# Patient Record
Sex: FEMALE | Race: ASIAN | NOT HISPANIC OR LATINO
[De-identification: names, ages, dates, MRNs, and addresses within clinical notes are randomized per-mention and may not be internally consistent; named-entity substitution may affect disease eponyms.]

---

## 2019-05-31 ENCOUNTER — APPOINTMENT (OUTPATIENT)
Dept: ANTEPARTUM | Facility: CLINIC | Age: 39
End: 2019-05-31
Payer: COMMERCIAL

## 2019-05-31 ENCOUNTER — ASOB RESULT (OUTPATIENT)
Age: 39
End: 2019-05-31

## 2019-05-31 PROCEDURE — 36416 COLLJ CAPILLARY BLOOD SPEC: CPT

## 2019-05-31 PROCEDURE — 76813 OB US NUCHAL MEAS 1 GEST: CPT

## 2019-05-31 PROCEDURE — 76801 OB US < 14 WKS SINGLE FETUS: CPT

## 2019-06-13 ENCOUNTER — ASOB RESULT (OUTPATIENT)
Age: 39
End: 2019-06-13

## 2019-06-13 ENCOUNTER — APPOINTMENT (OUTPATIENT)
Dept: MATERNAL FETAL MEDICINE | Facility: CLINIC | Age: 39
End: 2019-06-13
Payer: COMMERCIAL

## 2019-06-13 PROCEDURE — G0108 DIAB MANAGE TRN  PER INDIV: CPT

## 2019-06-26 ENCOUNTER — APPOINTMENT (OUTPATIENT)
Dept: ANTEPARTUM | Facility: CLINIC | Age: 39
End: 2019-06-26
Payer: COMMERCIAL

## 2019-06-26 ENCOUNTER — ASOB RESULT (OUTPATIENT)
Age: 39
End: 2019-06-26

## 2019-06-26 ENCOUNTER — APPOINTMENT (OUTPATIENT)
Dept: MATERNAL FETAL MEDICINE | Facility: CLINIC | Age: 39
End: 2019-06-26
Payer: COMMERCIAL

## 2019-06-26 DIAGNOSIS — O99.810 ABNORMAL GLUCOSE COMPLICATING PREGNANCY: ICD-10-CM

## 2019-06-26 PROCEDURE — G0108 DIAB MANAGE TRN  PER INDIV: CPT

## 2019-06-26 PROCEDURE — 76805 OB US >/= 14 WKS SNGL FETUS: CPT

## 2019-06-27 RX ORDER — PEN NEEDLE, DIABETIC 29 G X1/2"
32G X 4 MM NEEDLE, DISPOSABLE MISCELLANEOUS
Qty: 1 | Refills: 1 | Status: ACTIVE | COMMUNITY
Start: 2019-06-26 | End: 1900-01-01

## 2019-07-09 ENCOUNTER — ASOB RESULT (OUTPATIENT)
Age: 39
End: 2019-07-09

## 2019-07-09 ENCOUNTER — APPOINTMENT (OUTPATIENT)
Dept: MATERNAL FETAL MEDICINE | Facility: CLINIC | Age: 39
End: 2019-07-09
Payer: COMMERCIAL

## 2019-07-09 ENCOUNTER — APPOINTMENT (OUTPATIENT)
Dept: ANTEPARTUM | Facility: CLINIC | Age: 39
End: 2019-07-09
Payer: COMMERCIAL

## 2019-07-09 PROCEDURE — 99213 OFFICE O/P EST LOW 20 MIN: CPT | Mod: 25

## 2019-07-09 PROCEDURE — G0108 DIAB MANAGE TRN  PER INDIV: CPT

## 2019-07-22 ENCOUNTER — APPOINTMENT (OUTPATIENT)
Dept: ANTEPARTUM | Facility: CLINIC | Age: 39
End: 2019-07-22
Payer: COMMERCIAL

## 2019-07-22 ENCOUNTER — ASOB RESULT (OUTPATIENT)
Age: 39
End: 2019-07-22

## 2019-07-22 PROCEDURE — 76811 OB US DETAILED SNGL FETUS: CPT

## 2019-08-02 ENCOUNTER — MESSAGE (OUTPATIENT)
Age: 39
End: 2019-08-02

## 2019-08-02 RX ORDER — INSULIN DETEMIR 100 [IU]/ML
100 INJECTION, SOLUTION SUBCUTANEOUS
Qty: 4 | Refills: 1 | Status: ACTIVE | COMMUNITY
Start: 2019-06-26 | End: 1900-01-01

## 2019-08-07 ENCOUNTER — APPOINTMENT (OUTPATIENT)
Dept: MATERNAL FETAL MEDICINE | Facility: CLINIC | Age: 39
End: 2019-08-07
Payer: COMMERCIAL

## 2019-08-07 ENCOUNTER — ASOB RESULT (OUTPATIENT)
Age: 39
End: 2019-08-07

## 2019-08-07 ENCOUNTER — APPOINTMENT (OUTPATIENT)
Dept: ANTEPARTUM | Facility: CLINIC | Age: 39
End: 2019-08-07
Payer: COMMERCIAL

## 2019-08-07 PROCEDURE — 76825 ECHO EXAM OF FETAL HEART: CPT

## 2019-08-07 PROCEDURE — 93325 DOPPLER ECHO COLOR FLOW MAPG: CPT

## 2019-08-07 PROCEDURE — G0108 DIAB MANAGE TRN  PER INDIV: CPT

## 2019-08-07 PROCEDURE — 76827 ECHO EXAM OF FETAL HEART: CPT

## 2019-08-20 ENCOUNTER — APPOINTMENT (OUTPATIENT)
Dept: ANTEPARTUM | Facility: CLINIC | Age: 39
End: 2019-08-20

## 2019-08-21 ENCOUNTER — APPOINTMENT (OUTPATIENT)
Dept: ANTEPARTUM | Facility: CLINIC | Age: 39
End: 2019-08-21
Payer: COMMERCIAL

## 2019-08-21 ENCOUNTER — ASOB RESULT (OUTPATIENT)
Age: 39
End: 2019-08-21

## 2019-08-21 PROCEDURE — 76816 OB US FOLLOW-UP PER FETUS: CPT

## 2019-09-05 ENCOUNTER — ASOB RESULT (OUTPATIENT)
Age: 39
End: 2019-09-05

## 2019-09-05 ENCOUNTER — APPOINTMENT (OUTPATIENT)
Dept: MATERNAL FETAL MEDICINE | Facility: CLINIC | Age: 39
End: 2019-09-05
Payer: COMMERCIAL

## 2019-09-05 PROCEDURE — G0108 DIAB MANAGE TRN  PER INDIV: CPT

## 2019-09-19 ENCOUNTER — APPOINTMENT (OUTPATIENT)
Dept: MATERNAL FETAL MEDICINE | Facility: CLINIC | Age: 39
End: 2019-09-19
Payer: COMMERCIAL

## 2019-09-19 ENCOUNTER — APPOINTMENT (OUTPATIENT)
Dept: ANTEPARTUM | Facility: CLINIC | Age: 39
End: 2019-09-19

## 2019-09-19 ENCOUNTER — ASOB RESULT (OUTPATIENT)
Age: 39
End: 2019-09-19

## 2019-09-19 PROCEDURE — G0108 DIAB MANAGE TRN  PER INDIV: CPT

## 2019-09-19 RX ORDER — LANCETS
EACH MISCELLANEOUS
Qty: 3 | Refills: 2 | Status: ACTIVE | COMMUNITY
Start: 2019-09-05 | End: 1900-01-01

## 2019-09-19 RX ORDER — BLOOD SUGAR DIAGNOSTIC
STRIP MISCELLANEOUS 4 TIMES DAILY
Qty: 3 | Refills: 2 | Status: ACTIVE | COMMUNITY
Start: 2019-09-05 | End: 1900-01-01

## 2019-10-17 ENCOUNTER — ASOB RESULT (OUTPATIENT)
Age: 39
End: 2019-10-17

## 2019-10-17 ENCOUNTER — APPOINTMENT (OUTPATIENT)
Dept: MATERNAL FETAL MEDICINE | Facility: CLINIC | Age: 39
End: 2019-10-17
Payer: COMMERCIAL

## 2019-10-17 ENCOUNTER — APPOINTMENT (OUTPATIENT)
Dept: ANTEPARTUM | Facility: CLINIC | Age: 39
End: 2019-10-17
Payer: COMMERCIAL

## 2019-10-17 PROCEDURE — 76816 OB US FOLLOW-UP PER FETUS: CPT

## 2019-10-17 PROCEDURE — G0108 DIAB MANAGE TRN  PER INDIV: CPT

## 2019-10-17 PROCEDURE — 76819 FETAL BIOPHYS PROFIL W/O NST: CPT

## 2019-10-20 ENCOUNTER — OUTPATIENT (OUTPATIENT)
Dept: INPATIENT UNIT | Facility: HOSPITAL | Age: 39
LOS: 1 days | Discharge: HOME | End: 2019-10-20
Payer: COMMERCIAL

## 2019-10-20 VITALS — DIASTOLIC BLOOD PRESSURE: 59 MMHG | HEART RATE: 58 BPM | SYSTOLIC BLOOD PRESSURE: 114 MMHG

## 2019-10-20 VITALS
SYSTOLIC BLOOD PRESSURE: 135 MMHG | TEMPERATURE: 98 F | DIASTOLIC BLOOD PRESSURE: 70 MMHG | HEART RATE: 63 BPM | RESPIRATION RATE: 17 BRPM

## 2019-10-20 DIAGNOSIS — Z3A.00 WEEKS OF GESTATION OF PREGNANCY NOT SPECIFIED: ICD-10-CM

## 2019-10-20 DIAGNOSIS — O26.899 OTHER SPECIFIED PREGNANCY RELATED CONDITIONS, UNSPECIFIED TRIMESTER: ICD-10-CM

## 2019-10-20 LAB
APPEARANCE UR: SIGNIFICANT CHANGE UP
BACTERIA # UR AUTO: SIGNIFICANT CHANGE UP
BILIRUB UR-MCNC: NEGATIVE — SIGNIFICANT CHANGE UP
BLOOD UR QL VISUAL: HIGH
COLOR SPEC: YELLOW — SIGNIFICANT CHANGE UP
GLUCOSE UR-MCNC: 30 — SIGNIFICANT CHANGE UP
HCT VFR BLD CALC: 26.8 % — LOW (ref 34.5–45)
HGB BLD-MCNC: 8.6 G/DL — LOW (ref 11.5–15.5)
HYALINE CASTS # UR AUTO: HIGH
KETONES UR-MCNC: SIGNIFICANT CHANGE UP
LEUKOCYTE ESTERASE UR-ACNC: HIGH
MCHC RBC-ENTMCNC: 28.6 PG — SIGNIFICANT CHANGE UP (ref 27–34)
MCHC RBC-ENTMCNC: 32.1 % — SIGNIFICANT CHANGE UP (ref 32–36)
MCV RBC AUTO: 89 FL — SIGNIFICANT CHANGE UP (ref 80–100)
NITRITE UR-MCNC: NEGATIVE — SIGNIFICANT CHANGE UP
NRBC # FLD: 0 K/UL — SIGNIFICANT CHANGE UP (ref 0–0)
PH UR: 6 — SIGNIFICANT CHANGE UP (ref 5–8)
PLATELET # BLD AUTO: 136 K/UL — LOW (ref 150–400)
PMV BLD: 10.5 FL — SIGNIFICANT CHANGE UP (ref 7–13)
PROT UR-MCNC: 100 — HIGH
RBC # BLD: 3.01 M/UL — LOW (ref 3.8–5.2)
RBC # FLD: 13 % — SIGNIFICANT CHANGE UP (ref 10.3–14.5)
RBC CASTS # UR COMP ASSIST: >50 — HIGH (ref 0–?)
SP GR SPEC: 1.03 — SIGNIFICANT CHANGE UP (ref 1–1.04)
SQUAMOUS # UR AUTO: SIGNIFICANT CHANGE UP
UROBILINOGEN FLD QL: NORMAL — SIGNIFICANT CHANGE UP
WBC # BLD: 7.41 K/UL — SIGNIFICANT CHANGE UP (ref 3.8–10.5)
WBC # FLD AUTO: 7.41 K/UL — SIGNIFICANT CHANGE UP (ref 3.8–10.5)
WBC UR QL: HIGH (ref 0–?)

## 2019-10-20 PROCEDURE — 99214 OFFICE O/P EST MOD 30 MIN: CPT | Mod: 25

## 2019-10-20 PROCEDURE — 76770 US EXAM ABDO BACK WALL COMP: CPT | Mod: 26

## 2019-10-20 PROCEDURE — 76818 FETAL BIOPHYS PROFILE W/NST: CPT | Mod: 26

## 2019-10-20 RX ORDER — ACETAMINOPHEN 500 MG
975 TABLET ORAL ONCE
Refills: 0 | Status: COMPLETED | OUTPATIENT
Start: 2019-10-20 | End: 2019-10-20

## 2019-10-20 RX ORDER — DOCUSATE SODIUM 100 MG
1 CAPSULE ORAL
Qty: 60 | Refills: 0
Start: 2019-10-20 | End: 2019-11-18

## 2019-10-20 RX ORDER — SODIUM CHLORIDE 9 MG/ML
1000 INJECTION, SOLUTION INTRAVENOUS ONCE
Refills: 0 | Status: COMPLETED | OUTPATIENT
Start: 2019-10-20 | End: 2019-10-20

## 2019-10-20 RX ORDER — FERROUS SULFATE 325(65) MG
13 TABLET ORAL
Qty: 1170 | Refills: 0
Start: 2019-10-20 | End: 2019-11-18

## 2019-10-20 RX ADMIN — SODIUM CHLORIDE 2000 MILLILITER(S): 9 INJECTION, SOLUTION INTRAVENOUS at 06:26

## 2019-10-20 RX ADMIN — Medication 975 MILLIGRAM(S): at 06:12

## 2019-10-20 RX ADMIN — Medication 975 MILLIGRAM(S): at 06:27

## 2019-10-20 NOTE — OB PROVIDER TRIAGE NOTE - HISTORY OF PRESENT ILLNESS
39 yr old  @ 33.3 wk, presents with left sided sharp stabbing pain since 2 am. 6/10 pain. Pain relieved on standing, worsens while lying down, Denies strenuous activity or injury. Denies urinary symptoms, Denies VB/LOF/Ctx. Reports positive fetal movement.   PNI: GDMA 2 in levemir 28 units q HS  Obhs: , FT, NRFHT

## 2019-10-20 NOTE — OB PROVIDER TRIAGE NOTE - ADDITIONAL INSTRUCTIONS
Please take your iron supplement as prescribed. Please drink 1-2 liters of fluid per day. Please make an appointment to be seen in your prenatal office this week.

## 2019-10-20 NOTE — OB PROVIDER TRIAGE NOTE - NSOBPROVIDERNOTE_OBGYN_ALL_OB_FT
39 yr old  @ 33.3 wk, presents with left sided sharp stabbing pain since 2 am. 10 pain. Pain relieved on standing, worsens while lying down, Denies strenuous activity or injury. Denies urinary symptoms, Denies VB/LOF/Ctx. Reports positive fetal movement.   PNI: GDMA 2 in levemir 28 units q HS  Obhx: , FT, NRFHT     VS: 132/70  TOCO: ctx irritability  NST: , +accels, -decels, moderate variability, in progress  Abd: soft, non-tender  Mild CVA on left side    Plan:   IV hydration  PO tylenol  Renal sono  UA 39 yr old  @ 33.3 wk, presents with left sided sharp stabbing pain since 2 am. 6/10 pain. Pain relieved on standing, worsens while lying down, Denies strenuous activity or injury. Denies urinary symptoms, Denies VB/LOF/Ctx. Reports positive fetal movement.   PNI: GDMA 2 in levemir 28 units q HS  Obhx: , FT, NRFHT 2016    VS: 132/70  TOCO: ctx irritability  NST: , +accels, -decels, moderate variability, in progress  Abd: soft, non-tender  Mild CVA on left side    Plan:   IV hydration  PO tylenol  Renal sono  UA    @ 0624  s/p renal scan  UA with 100 protein, >50 RBC, 11-25 WBC, Large Blood, Trace Ketones  VE: L/C/P  CAT 1 tracing  TOCO: ctx irregular 39 yr old  @ 33.3 wk, presents with left sided sharp stabbing pain since 2 am. 6/10 pain. Pain relieved on standing, worsens while lying down, Denies strenuous activity or injury. Denies urinary symptoms, Denies VB/LOF/Ctx. Reports positive fetal movement.   PNI: GDMA 2 in levemir 28 units q HS  Obhx: , FT, NRFHT 2016    VS: 132/70  TOCO: ctx irritability  NST: , +accels, -decels, moderate variability, in progress  Abd: soft, non-tender  Mild CVA on left side    Plan:   IV hydration  PO tylenol  Renal sono  UA    @ 0624  s/p renal scan  UA with 100 protein, >50 RBC, 11-25 WBC, Large Blood, Trace Ketones  VE: L/C/P  CAT 1 tracing  TOCO: ctx irregular    07    Received report from BRIANNA Salas CNM. awaiting cbc.    0803  7.4 > 8.6/24.8 < 136  pt is s/p RL 1L IVB    pt verbalize relief from pain    discussed with Dr Gonzalez. Pt for discharge home with  precautions/fetal kick counts reviewed. Encouraged increased PO hydration. Triage team will follow up with ucx result. Pt to increased ferrous sulfate supplement and follow up with prenatal appointment in OB office this week.    CÉSAR Alicia 39 yr old  @ 33.3 wk, presents with left sided sharp stabbing pain since 2 am. 6/10 pain. Pain relieved on standing, worsens while lying down, Denies strenuous activity or injury. Denies urinary symptoms, Denies VB/LOF/Ctx. Reports positive fetal movement.   PNI: GDMA 2 in levemir 28 units q HS  Obhx: , FT, NRFHT 2016    VS: 132/70  TOCO: ctx irritability  NST: , +accels, -decels, moderate variability, in progress  Abd: soft, non-tender  Mild CVA on left side    Plan:   IV hydration  PO tylenol  Renal sono  UA    @ 0624  s/p renal scan  UA with 100 protein, >50 RBC, 11-25 WBC, Large Blood, Trace Ketones  VE: L/C/P  CAT 1 tracing  TOCO: ctx irregular    07    Received report from BRIANNA Salas CNM. awaiting cbc.    0803  7.4 > 8.6/24.8 < 136  pt is s/p RL 1L IVB    pt verbalize relief from pain    discussed with Dr Gonzalez. Pt for discharge home with  precautions/fetal kick counts reviewed. Encouraged increased PO hydration. Triage team will follow up with ucx result. Pt to increased ferrous sulfate supplement (rx for ferrous sulfate and colace sent to patient pharmacy) and follow up with prenatal appointment in OB office this week.    CÉSAR Alicia

## 2019-10-20 NOTE — OB PROVIDER TRIAGE NOTE - NSHPPHYSICALEXAM_GEN_ALL_CORE
VS: 132/70  TOCO: ctx irritability  NST: , +accels, -decels, moderate variability, in progress  Abd: soft, non-tender  Mild CVA on left side

## 2019-10-20 NOTE — OB PROVIDER TRIAGE NOTE - NSOUTCOME1_OBGYN_ALL_OB
Patient arrived to room 2135 as direct admit - patient with cardiac history and GI bleed. Hgb 5.8 at 1129 today. Current bed not tele capable. Patient to be transferred. Labs obtained and IV placed to left forearm at this time. Patient and family notified that room will have to be changed. 1556: Call from lab - CBC and PT clotted and need to be redrawn. Notified primary nurse. Term

## 2019-10-20 NOTE — OB PROVIDER TRIAGE NOTE - NSHPLABSRESULTS_GEN_ALL_CORE
Urinalysis Basic - ( 20 Oct 2019 05:10 )    Color: YELLOW / Appearance: Lt TURBID / S.035 / pH: 6.0  Gluc: 30 / Ketone: TRACE  / Bili: NEGATIVE / Urobili: NORMAL   Blood: LARGE / Protein: 100 / Nitrite: NEGATIVE   Leuk Esterase: SMALL / RBC: >50 / WBC 11-25   Sq Epi: MODERATE / Non Sq Epi: x / Bacteria: FEW

## 2019-10-30 ENCOUNTER — OUTPATIENT (OUTPATIENT)
Dept: INPATIENT UNIT | Facility: HOSPITAL | Age: 39
LOS: 1 days | Discharge: HOME | End: 2019-10-30
Payer: COMMERCIAL

## 2019-10-30 VITALS
RESPIRATION RATE: 16 BRPM | DIASTOLIC BLOOD PRESSURE: 65 MMHG | TEMPERATURE: 98 F | SYSTOLIC BLOOD PRESSURE: 94 MMHG | HEART RATE: 85 BPM

## 2019-10-30 VITALS — HEART RATE: 79 BPM | SYSTOLIC BLOOD PRESSURE: 116 MMHG | DIASTOLIC BLOOD PRESSURE: 54 MMHG

## 2019-10-30 DIAGNOSIS — Z3A.00 WEEKS OF GESTATION OF PREGNANCY NOT SPECIFIED: ICD-10-CM

## 2019-10-30 DIAGNOSIS — O26.899 OTHER SPECIFIED PREGNANCY RELATED CONDITIONS, UNSPECIFIED TRIMESTER: ICD-10-CM

## 2019-10-30 PROBLEM — N83.201 UNSPECIFIED OVARIAN CYST, RIGHT SIDE: Chronic | Status: ACTIVE | Noted: 2019-10-20

## 2019-10-30 PROBLEM — R87.619 UNSPECIFIED ABNORMAL CYTOLOGICAL FINDINGS IN SPECIMENS FROM CERVIX UTERI: Chronic | Status: ACTIVE | Noted: 2019-10-20

## 2019-10-30 PROCEDURE — 59025 FETAL NON-STRESS TEST: CPT | Mod: 26

## 2019-10-30 NOTE — OB PROVIDER TRIAGE NOTE - HISTORY OF PRESENT ILLNESS
38 y.o. G 39 y.o.  MERLE 19 @ 34.6 weeks presents from scheduled prenatal appointment for evaluation of nonreactive NST in office. Pt denies LOF, VB, ctx. States +FM. Antepartum course complicated by GDMA2 on levemir.

## 2019-10-30 NOTE — OB PROVIDER TRIAGE NOTE - NSHPPHYSICALEXAM_GEN_ALL_CORE
abdomen soft, nontender  taus: sliup, cephalic presentation posterior placenta, bpp 8/8, karl 15.3  nst reactive  toco: occ ctx noted

## 2019-10-30 NOTE — OB PROVIDER TRIAGE NOTE - NSOBPROVIDERNOTE_OBGYN_ALL_OB_FT
39 y.o.  MERLE 19 @ 34.6 weeks presents from scheduled prenatal appointment for evaluation of nonreactive NST in office. Pt denies LOF, VB, ctx. States +FM. Antepartum course complicated by GDMA2 on levemir.    allergies:  NKDA  medications:  prenatal vitamins  levemir 28 units q HS     med/surg hx:  cs x 1  OBGYN hx:  right ovarian cyst  2016 primary cs 2/2 NRFHT 7lbs 8 oz    abdomen soft, nontender  taus: sliup, cephalic presentation posterior placenta, bpp 8/8, karl 15.3  nst reactive  toco: occ ctx noted    evidence of reactive NST  BPP 10/10    discussed with  39 y.o.  MERLE 19 @ 34.6 weeks presents from scheduled prenatal appointment for evaluation of nonreactive NST in office. Pt denies LOF, VB, ctx. States +FM. Antepartum course complicated by GDMA2 on levemir.    allergies:  NKDA  medications:  prenatal vitamins  levemir 28 units q HS     med/surg hx:  cs x 1  OBGYN hx:  right ovarian cyst  2016 primary cs 2/2 NRFHT 7lbs 8 oz    abdomen soft, nontender  taus: sliup, cephalic presentation posterior placenta, bpp 8/8, karl 15.3  nst reactive  toco: occ ctx noted    evidence of reactive NST  BPP 10/10    discussed with Dr Guadarrama. Pt discharged home with  labor precautions/fetal kick counts reviewed. Encouraged increased PO hydration. F/U next scheduled prenatal appointment.    Maral, NP

## 2019-11-08 ENCOUNTER — APPOINTMENT (OUTPATIENT)
Dept: ANTEPARTUM | Facility: CLINIC | Age: 39
End: 2019-11-08
Payer: COMMERCIAL

## 2019-11-08 ENCOUNTER — ASOB RESULT (OUTPATIENT)
Age: 39
End: 2019-11-08

## 2019-11-08 ENCOUNTER — APPOINTMENT (OUTPATIENT)
Dept: MATERNAL FETAL MEDICINE | Facility: CLINIC | Age: 39
End: 2019-11-08
Payer: COMMERCIAL

## 2019-11-08 PROCEDURE — 76816 OB US FOLLOW-UP PER FETUS: CPT

## 2019-11-08 PROCEDURE — G0108 DIAB MANAGE TRN  PER INDIV: CPT

## 2019-11-12 ENCOUNTER — APPOINTMENT (OUTPATIENT)
Dept: ANTEPARTUM | Facility: HOSPITAL | Age: 39
End: 2019-11-12

## 2019-11-15 ENCOUNTER — OUTPATIENT (OUTPATIENT)
Dept: OUTPATIENT SERVICES | Facility: HOSPITAL | Age: 39
LOS: 1 days | End: 2019-11-15

## 2019-11-15 ENCOUNTER — ASOB RESULT (OUTPATIENT)
Age: 39
End: 2019-11-15

## 2019-11-15 ENCOUNTER — APPOINTMENT (OUTPATIENT)
Dept: ANTEPARTUM | Facility: CLINIC | Age: 39
End: 2019-11-15
Payer: COMMERCIAL

## 2019-11-15 ENCOUNTER — APPOINTMENT (OUTPATIENT)
Dept: ANTEPARTUM | Facility: HOSPITAL | Age: 39
End: 2019-11-15

## 2019-11-15 PROCEDURE — 76818 FETAL BIOPHYS PROFILE W/NST: CPT | Mod: 26

## 2019-11-22 ENCOUNTER — ASOB RESULT (OUTPATIENT)
Age: 39
End: 2019-11-22

## 2019-11-22 ENCOUNTER — APPOINTMENT (OUTPATIENT)
Dept: MATERNAL FETAL MEDICINE | Facility: CLINIC | Age: 39
End: 2019-11-22
Payer: COMMERCIAL

## 2019-11-22 ENCOUNTER — OUTPATIENT (OUTPATIENT)
Dept: OUTPATIENT SERVICES | Facility: HOSPITAL | Age: 39
LOS: 1 days | End: 2019-11-22

## 2019-11-22 ENCOUNTER — APPOINTMENT (OUTPATIENT)
Dept: ANTEPARTUM | Facility: HOSPITAL | Age: 39
End: 2019-11-22

## 2019-11-22 ENCOUNTER — APPOINTMENT (OUTPATIENT)
Dept: ANTEPARTUM | Facility: CLINIC | Age: 39
End: 2019-11-22
Payer: COMMERCIAL

## 2019-11-22 VITALS
RESPIRATION RATE: 14 BRPM | OXYGEN SATURATION: 98 % | SYSTOLIC BLOOD PRESSURE: 124 MMHG | DIASTOLIC BLOOD PRESSURE: 70 MMHG | WEIGHT: 199.08 LBS | HEART RATE: 74 BPM | HEIGHT: 63 IN | TEMPERATURE: 99 F

## 2019-11-22 DIAGNOSIS — Z34.90 ENCOUNTER FOR SUPERVISION OF NORMAL PREGNANCY, UNSPECIFIED, UNSPECIFIED TRIMESTER: ICD-10-CM

## 2019-11-22 DIAGNOSIS — E11.9 TYPE 2 DIABETES MELLITUS WITHOUT COMPLICATIONS: ICD-10-CM

## 2019-11-22 LAB
ANION GAP SERPL CALC-SCNC: 14 MMO/L — SIGNIFICANT CHANGE UP (ref 7–14)
APPEARANCE UR: CLEAR — SIGNIFICANT CHANGE UP
BACTERIA # UR AUTO: NEGATIVE — SIGNIFICANT CHANGE UP
BILIRUB UR-MCNC: NEGATIVE — SIGNIFICANT CHANGE UP
BLD GP AB SCN SERPL QL: NEGATIVE — SIGNIFICANT CHANGE UP
BLOOD UR QL VISUAL: HIGH
BUN SERPL-MCNC: 12 MG/DL — SIGNIFICANT CHANGE UP (ref 7–23)
CALCIUM SERPL-MCNC: 9.6 MG/DL — SIGNIFICANT CHANGE UP (ref 8.4–10.5)
CHLORIDE SERPL-SCNC: 103 MMOL/L — SIGNIFICANT CHANGE UP (ref 98–107)
CO2 SERPL-SCNC: 22 MMOL/L — SIGNIFICANT CHANGE UP (ref 22–31)
COLOR SPEC: YELLOW — SIGNIFICANT CHANGE UP
CREAT SERPL-MCNC: 0.84 MG/DL — SIGNIFICANT CHANGE UP (ref 0.5–1.3)
GLUCOSE SERPL-MCNC: 83 MG/DL — SIGNIFICANT CHANGE UP (ref 70–99)
GLUCOSE UR-MCNC: NEGATIVE — SIGNIFICANT CHANGE UP
HCT VFR BLD CALC: 41.4 % — SIGNIFICANT CHANGE UP (ref 34.5–45)
HGB BLD-MCNC: 13.4 G/DL — SIGNIFICANT CHANGE UP (ref 11.5–15.5)
HYALINE CASTS # UR AUTO: NEGATIVE — SIGNIFICANT CHANGE UP
KETONES UR-MCNC: NEGATIVE — SIGNIFICANT CHANGE UP
LEUKOCYTE ESTERASE UR-ACNC: NEGATIVE — SIGNIFICANT CHANGE UP
MCHC RBC-ENTMCNC: 28.8 PG — SIGNIFICANT CHANGE UP (ref 27–34)
MCHC RBC-ENTMCNC: 32.4 % — SIGNIFICANT CHANGE UP (ref 32–36)
MCV RBC AUTO: 88.8 FL — SIGNIFICANT CHANGE UP (ref 80–100)
NITRITE UR-MCNC: NEGATIVE — SIGNIFICANT CHANGE UP
NRBC # FLD: 0 K/UL — SIGNIFICANT CHANGE UP (ref 0–0)
PH UR: 6 — SIGNIFICANT CHANGE UP (ref 5–8)
PLATELET # BLD AUTO: 219 K/UL — SIGNIFICANT CHANGE UP (ref 150–400)
PMV BLD: 11.3 FL — SIGNIFICANT CHANGE UP (ref 7–13)
POTASSIUM SERPL-MCNC: 3.9 MMOL/L — SIGNIFICANT CHANGE UP (ref 3.5–5.3)
POTASSIUM SERPL-SCNC: 3.9 MMOL/L — SIGNIFICANT CHANGE UP (ref 3.5–5.3)
PROT UR-MCNC: 10 — SIGNIFICANT CHANGE UP
RBC # BLD: 4.66 M/UL — SIGNIFICANT CHANGE UP (ref 3.8–5.2)
RBC # FLD: 13.2 % — SIGNIFICANT CHANGE UP (ref 10.3–14.5)
RBC CASTS # UR COMP ASSIST: HIGH (ref 0–?)
RH IG SCN BLD-IMP: POSITIVE — SIGNIFICANT CHANGE UP
SODIUM SERPL-SCNC: 139 MMOL/L — SIGNIFICANT CHANGE UP (ref 135–145)
SP GR SPEC: 1.02 — SIGNIFICANT CHANGE UP (ref 1–1.04)
SQUAMOUS # UR AUTO: SIGNIFICANT CHANGE UP
UROBILINOGEN FLD QL: NORMAL — SIGNIFICANT CHANGE UP
WBC # BLD: 8.35 K/UL — SIGNIFICANT CHANGE UP (ref 3.8–10.5)
WBC # FLD AUTO: 8.35 K/UL — SIGNIFICANT CHANGE UP (ref 3.8–10.5)
WBC UR QL: SIGNIFICANT CHANGE UP (ref 0–?)

## 2019-11-22 PROCEDURE — 76818 FETAL BIOPHYS PROFILE W/NST: CPT | Mod: 26

## 2019-11-22 PROCEDURE — G0108 DIAB MANAGE TRN  PER INDIV: CPT

## 2019-11-22 RX ORDER — ASPIRIN/CALCIUM CARB/MAGNESIUM 324 MG
1 TABLET ORAL
Qty: 0 | Refills: 0 | DISCHARGE

## 2019-11-22 RX ORDER — CHOLECALCIFEROL (VITAMIN D3) 125 MCG
1 CAPSULE ORAL
Qty: 0 | Refills: 0 | DISCHARGE

## 2019-11-22 RX ORDER — FERROUS SULFATE 325(65) MG
1 TABLET ORAL
Qty: 0 | Refills: 0 | DISCHARGE

## 2019-11-22 RX ORDER — SODIUM CHLORIDE 9 MG/ML
1000 INJECTION, SOLUTION INTRAVENOUS
Refills: 0 | Status: DISCONTINUED | OUTPATIENT
Start: 2019-12-05 | End: 2019-12-06

## 2019-11-22 NOTE — OB PST NOTE - PMH
Abnormal cervical Papanicolaou smear, unspecified abnormal pap finding    AMA (advanced maternal age) multigravida 35+, third trimester    Cyst of right ovary    DM (diabetes mellitus)  Class B  Galactosemia  Galactosemia genetics  Pregnancy  Intrauterine insemination

## 2019-11-22 NOTE — OB PST NOTE - NSHPPHYSICALEXAM_GEN_ALL_CORE
Constitutional: Well Developed, Well Groomed, Well Nourished, No Distress    Eyes: PERRL, EOMI, conjunctiva clear    Ears: Normal    Mouth & Gums: Normal, moist    Pharynx: No tenderness, discharge, or peritonsillar abscess    Tonsils: No Redness, discharge, tenderness, or swelling    Neck: Supple, no JVD, normal thyroid glands, no carotid bruits, no cervical vertebral or paraspinal tenderness    Breast: deferred    Back: Normal shape, ROM intact, strength intact, no vertebral tenderness    Respiratory: Airway patent, breath sounds equal, good air movement, respiration non-labored, clear to auscultation bilateral, no chest wall tenderness, no intercostal retractions, no rales, no wheezes, no rhonchi, no subcutaneous emphysema    Cardiovascular:  Regular rate and rhythm, no rubs or murmur, normal PMI    Gastrointestinal: gravid uterus, good fetal movements as per mom , bowel sound normal, no bruit, no rebound tenderness    Extremities: No clubbing, cyanosis, or pedal edema    Vascular:  Carotid Pulse normal , Radial Pulse normal, Femoral Pulse normal, DP pulse normal, PT pulse normal    Neurological: alert & oriented x 3, sensation intact, deep reflexes intact, cranial nerve intact, normal strength    Skin: warm and dry, normal color    Lymph Nodes: normal posterior cervical lymph node, normal anterior cervical lymph node, normal supraclavicular lymph node, normal axillary lymph node, normal inguinal lymph node, normal femoral lymph node    Musculoskeletal: ROM intact, no joint swelling, warmth, or calf tenderness. Normal strength    Psychiatric: normal affect, normal behavior

## 2019-11-22 NOTE — OB PST NOTE - NS_OBGYNHISTORY_OBGYN_ALL_OB_FT
39 yrs old female with h/o C section in 2016, BEVERLY MUNOZ, Class B DM on Levemir, + Galactosemia genetics

## 2019-11-22 NOTE — OB PST NOTE - ASSESSMENT
39 yrs old female 38.1 wks pregnant  presents for preop eval to have C section on 19 for AMA, previous C section , IUI pregnancy, Class B DM, IUI insemination. + Galactosemia Genetics.

## 2019-11-22 NOTE — OB PST NOTE - PROBLEM SELECTOR PLAN 1
39 yrs old female 38.1 wks pregnant  presents for preop eval to have C section on 19.   labs pending,   Preop C section instructions provided to pt.   Chlorhexidine scrubs provided to pt with instructions. Pt advised to consult Ob regarding stopping of aspirin prior to surgery.

## 2019-11-22 NOTE — OB PST NOTE - PROBLEM SELECTOR PLAN 2
Pt with gestational DM on Levemir, Pt advised to consult with her Ob regarding dosage pf insulin the day before the C section.

## 2019-11-22 NOTE — OB PST NOTE - NSHPREVIEWOFSYSTEMS_GEN_ALL_CORE
General: No fever, chills, sweating, anorexia, weight loss or weight gain. No polyphagia, polyurea, polydypsia, malaise, or fatigue    Skin: No rashes, itching, or dryness. No change in size/color of moles. No tumors, brittle nails, pitted nails, or hair loss    Breast: No tenderness, lumps, or nipple discharge      Ophthalmologic: No diplopia, photophobia, lacrimation, blurred Vision , or eye discharge    ENMT Symptoms: No hearing difficulty, ear pain, tinnitus, or vertigo. No sinus symptoms, nasal congestion, nasal   discharge, or nasal obstruction    Respiratory and Thorax: No wheezing, dyspnea, cough, hemoptysis, or pleuritic chest Pain     Cardiovascular: No chest pain, palpitations, dyspnea on exertion, orthopnea, paroxysmal nocturnal dyspnea,   peripheral edema, or claudication    Gastrointestinal: nausea 1st trimester, vomiting, diarrhea, +constipation, change in bowel habits, flatulence, abdominal pain, or melena    Genitourinary/ Pelvis: No hematuria, renal colic, or flank pain.  No urine discoloration, incontinence, dysuria, or urinary hesitancy. Normal urinary frequency. No nocturia, abnormal vaginal bleeding, vaginal discharge, spotting, pelvic pain, or vaginal leakage, + UTI in beginning of 3rd trimester treated with keflex and vaginal cream    Musculoskeletal: No arthralgia, arthritis, joint swelling, muscle cramping, muscle weakness, neck pain, arm pain, or leg pain    Neurological: No transient paralysis, weakness, paresthesias, or seizures. No syncope, tremors, vertigo, loss of sensation, difficulty walking, loss of consciousness, hemiparesis, confusion, or facial palsy    Psychiatric: No suicidal ideation, depression, anxiety, insomnia, memory loss, paranoia, mood swings, agitation, hallucinations, or hyperactivity    Hematology: No gum bleeding, nose bleeding, or skin lumps    Lymphatic: No enlarged or tender lymph nodes. No extremity swelling    Endocrine: No heat or cold intolerance, +DM class B on levemir    Immunologic: No recurrent or persistent infections

## 2019-11-23 LAB — T PALLIDUM AB TITR SER: NEGATIVE — SIGNIFICANT CHANGE UP

## 2019-11-26 ENCOUNTER — APPOINTMENT (OUTPATIENT)
Dept: ANTEPARTUM | Facility: CLINIC | Age: 39
End: 2019-11-26
Payer: COMMERCIAL

## 2019-11-26 ENCOUNTER — ASOB RESULT (OUTPATIENT)
Age: 39
End: 2019-11-26

## 2019-11-26 ENCOUNTER — OUTPATIENT (OUTPATIENT)
Dept: INPATIENT UNIT | Facility: HOSPITAL | Age: 39
LOS: 1 days | Discharge: HOME | End: 2019-11-26
Payer: COMMERCIAL

## 2019-11-26 ENCOUNTER — OUTPATIENT (OUTPATIENT)
Dept: OUTPATIENT SERVICES | Facility: HOSPITAL | Age: 39
LOS: 1 days | End: 2019-11-26

## 2019-11-26 VITALS
SYSTOLIC BLOOD PRESSURE: 127 MMHG | RESPIRATION RATE: 14 BRPM | HEART RATE: 75 BPM | DIASTOLIC BLOOD PRESSURE: 76 MMHG | TEMPERATURE: 99 F

## 2019-11-26 DIAGNOSIS — O26.899 OTHER SPECIFIED PREGNANCY RELATED CONDITIONS, UNSPECIFIED TRIMESTER: ICD-10-CM

## 2019-11-26 DIAGNOSIS — Z3A.00 WEEKS OF GESTATION OF PREGNANCY NOT SPECIFIED: ICD-10-CM

## 2019-11-26 PROBLEM — E11.9 TYPE 2 DIABETES MELLITUS WITHOUT COMPLICATIONS: Chronic | Status: ACTIVE | Noted: 2019-11-22

## 2019-11-26 PROBLEM — Z34.90 ENCOUNTER FOR SUPERVISION OF NORMAL PREGNANCY, UNSPECIFIED, UNSPECIFIED TRIMESTER: Chronic | Status: ACTIVE | Noted: 2019-11-22

## 2019-11-26 PROBLEM — E74.21 GALACTOSEMIA: Chronic | Status: ACTIVE | Noted: 2019-11-22

## 2019-11-26 PROBLEM — O09.523 SUPERVISION OF ELDERLY MULTIGRAVIDA, THIRD TRIMESTER: Chronic | Status: ACTIVE | Noted: 2019-11-22

## 2019-11-26 PROCEDURE — 76816 OB US FOLLOW-UP PER FETUS: CPT | Mod: 26

## 2019-11-26 PROCEDURE — 59025 FETAL NON-STRESS TEST: CPT | Mod: 26

## 2019-11-26 PROCEDURE — 76819 FETAL BIOPHYS PROFIL W/O NST: CPT | Mod: 26

## 2019-11-26 NOTE — OB PROVIDER TRIAGE NOTE - NSHPPHYSICALEXAM_GEN_ALL_CORE
Vital Signs Last 24 Hrs  T(C): 37.0 (26 Nov 2019 14:03), Max: 37 (26 Nov 2019 11:33)  T(F): 98.6 (26 Nov 2019 14:03), Max: 98.6 (26 Nov 2019 11:33)  HR: 78 (26 Nov 2019 14:04) (75 - 78)  BP: 107/68 (26 Nov 2019 14:04) (107/68 - 127/76)  BP(mean): --  RR: 14 (26 Nov 2019 11:33) (14 - 14)  SpO2: --    Abdomen soft, non tender  TAS:    will continue to monitor  NST in progress Vital Signs Last 24 Hrs  T(C): 37.0 (26 Nov 2019 14:03), Max: 37 (26 Nov 2019 11:33)  T(F): 98.6 (26 Nov 2019 14:03), Max: 98.6 (26 Nov 2019 11:33)  HR: 78 (26 Nov 2019 14:04) (75 - 78)  BP: 107/68 (26 Nov 2019 14:04) (107/68 - 127/76)  BP(mean): --  RR: 14 (26 Nov 2019 11:33) (14 - 14)  SpO2: --    Abdomen soft, non tender  TAS: VALERIY 16.84, BPP 8/8, 3419gm, vertex presentation, posterior placenta  SVE in office 11/26/2019: cervix closed     will continue to monitor  NST in progress Vital Signs Last 24 Hrs  T(C): 37.0 (26 Nov 2019 14:03), Max: 37 (26 Nov 2019 11:33)  T(F): 98.6 (26 Nov 2019 14:03), Max: 98.6 (26 Nov 2019 11:33)  HR: 78 (26 Nov 2019 14:04) (75 - 78)  BP: 107/68 (26 Nov 2019 14:04) (107/68 - 127/76)  BP(mean): --  RR: 14 (26 Nov 2019 11:33) (14 - 14)  SpO2: --    Abdomen soft, non tender  TAS: VALERIY 16.84, BPP 8/8, 3419gm, vertex presentation, posterior placenta  SVE in office by Dr Castle 11/26/2019: cervix closed     will continue to monitor  NST in progress

## 2019-11-26 NOTE — OB PROVIDER TRIAGE NOTE - HISTORY OF PRESENT ILLNESS
40 y/o pt 38.5 weeks presents to triage for prolonged monitoring due to minimal variability on NST in office. pt denies bleeding, LOF, and contractions. pt denies n/v/d, fever or chills. +FM  AP complicated by GDM A2 on Levemir  Prenatal care with Dr Avalos 38 y/o pt 38.5 weeks presents to triage for prolonged monitoring due to minimal variability on NST in office. pt denies bleeding, LOF, and contractions. pt denies n/v/d, fever or chills. +FM  AP complicated by GDM A2 on Levemir  Prenatal care with Dr Avalos   Last PO @ 9369

## 2019-11-26 NOTE — OB PROVIDER TRIAGE NOTE - ADDITIONAL INSTRUCTIONS
pt to follow up with scheduled appointment with MFM on 12/03/2019  fetal kick counts reviewed  labor precautions reviewed

## 2019-11-26 NOTE — OB PROVIDER TRIAGE NOTE - NS_OBGYNHISTORY_OBGYN_ALL_OB_FT
OB:   for fetal distress & OP 2016 FT 7#8    GYN:  Abnormal cervical Papanicolaou smear, unspecified abnormal pap finding    Cyst of right ovary

## 2019-11-26 NOTE — OB PROVIDER TRIAGE NOTE - NSOBPROVIDERNOTE_OBGYN_ALL_OB_FT
38 y/o pt 38.5 weeks presents to triage for prolonged monitoring due to minimal variability on NST in office. pt denies bleeding, LOF, and contractions. pt denies n/v/d, fever or chills. +FM  AP complicated by GDM A2 on Levemir  Prenatal care with Dr Robbie GREENE  med/surg hx:  GDM A2   GYN hx:  Abnormal cervical Papanicolaou smear, unspecified abnormal pap finding    Cyst of right ovary  OB hx:   for OP and non-reassuring tracing 2016 Ft 7#8  Meds:   PNV  Levemir 28 units    Abdomen soft, non tender  TAS:    will continue to monitor  NST in progress 40 y/o pt 38.5 weeks presents to triage for prolonged monitoring due to minimal variability on NST in office. pt denies bleeding, LOF, and contractions. pt denies n/v/d, fever or chills. +FM  AP complicated by GDM A2 on Levemir  Prenatal care with Dr Avalos   Last PO @ 0900    NKDA  med/surg hx:  GDM A2   GYN hx:  Abnormal cervical Papanicolaou smear, unspecified abnormal pap finding    Cyst of right ovary  OB hx:   for OP and non-reassuring tracing 2016 Ft 7#8  Meds:   PNV  Levemir 28 units    Abdomen soft, non tender  TAS:    will continue to monitor  NST in progress 38 y/o pt 38.5 weeks presents to triage for prolonged monitoring due to minimal variability on NST in office. pt denies bleeding, LOF, and contractions. pt denies n/v/d, fever or chills. +FM  AP complicated by GDM A2 on Levemir  Prenatal care with Dr Avalos   Last PO @ 0900  scheduled  2019    NKDA  med/surg hx:  GDM A2   GYN hx:  Abnormal cervical Papanicolaou smear, unspecified abnormal pap finding    Cyst of right ovary  OB hx:   for OP and non-reassuring tracing 2016 Ft 7#8  Meds:   PNV  Levemir 28 units    Abdomen soft, non tender  TAS: VALERIY 16.84, BPP 8/8, 3419gm, vertex presentation, posterior placenta  SVE in office 2019: cervix closed     will continue to monitor  NST in progress    @1555  NST reactive with moderate variability, cat 1  toco irregular contractions  pt denies feeling any contractions    d/w with Dr Lam  maternal and fetal status reassuring   pt to follow up with scheduled appointment with MFM on 2019  fetal kick counts reviewed  labor precautions reviewed 38 y/o pt 38.5 weeks presents to triage for prolonged monitoring due to minimal variability on NST in office. pt denies bleeding, LOF, and contractions. pt denies n/v/d, fever or chills. +FM  AP complicated by GDM A2 on Levemir  Prenatal care with Dr Avalos   Last PO @ 0900  scheduled  2019    NKDA  med/surg hx:  GDM A2   GYN hx:  Abnormal cervical Papanicolaou smear, unspecified abnormal pap finding    Cyst of right ovary  OB hx:   for OP and non-reassuring tracing 2016 Ft 7#8  Meds:   PNV  Levemir 28 units    Abdomen soft, non tender  TAS: VALERIY 16.84, BPP 8/8, 3419gm, vertex presentation, posterior placenta  SVE in office by Dr Castle 2019: cervix closed     will continue to monitor  NST in progress    @1555  NST reactive with moderate variability, cat 1  toco irregular contractions  pt denies feeling any contractions    d/w with Dr Lam  maternal and fetal status reassuring   pt to follow up with scheduled appointment with MFM on 2019  fetal kick counts reviewed  labor precautions reviewed

## 2019-11-26 NOTE — OB PROVIDER TRIAGE NOTE - NS_FINALEDD_OBGYN_ALL_OB_DT
05-Dec-2019 [Eyeglasses] : currently wearing eyeglasses [Lower Ext Edema] : lower extremity edema [Cough] : cough [Urinary Frequency] : urinary frequency [see HPI] : see HPI [Negative] : Heme/Lymph

## 2019-12-03 ENCOUNTER — APPOINTMENT (OUTPATIENT)
Dept: ANTEPARTUM | Facility: CLINIC | Age: 39
End: 2019-12-03

## 2019-12-03 ENCOUNTER — APPOINTMENT (OUTPATIENT)
Dept: ANTEPARTUM | Facility: HOSPITAL | Age: 39
End: 2019-12-03

## 2019-12-04 ENCOUNTER — TRANSCRIPTION ENCOUNTER (OUTPATIENT)
Age: 39
End: 2019-12-04

## 2019-12-05 ENCOUNTER — TRANSCRIPTION ENCOUNTER (OUTPATIENT)
Age: 39
End: 2019-12-05

## 2019-12-05 ENCOUNTER — INPATIENT (INPATIENT)
Facility: HOSPITAL | Age: 39
LOS: 2 days | Discharge: HOME | End: 2019-12-08
Attending: STUDENT IN AN ORGANIZED HEALTH CARE EDUCATION/TRAINING PROGRAM | Admitting: STUDENT IN AN ORGANIZED HEALTH CARE EDUCATION/TRAINING PROGRAM

## 2019-12-05 VITALS — HEART RATE: 74 BPM | SYSTOLIC BLOOD PRESSURE: 132 MMHG | DIASTOLIC BLOOD PRESSURE: 97 MMHG

## 2019-12-05 DIAGNOSIS — Z98.891 HISTORY OF UTERINE SCAR FROM PREVIOUS SURGERY: ICD-10-CM

## 2019-12-05 DIAGNOSIS — O24.414 GESTATIONAL DIABETES MELLITUS IN PREGNANCY, INSULIN CONTROLLED: ICD-10-CM

## 2019-12-05 LAB
BLD GP AB SCN SERPL QL: NEGATIVE — SIGNIFICANT CHANGE UP
GLUCOSE BLDC GLUCOMTR-MCNC: 110 MG/DL — HIGH (ref 70–99)
GLUCOSE BLDC GLUCOMTR-MCNC: 69 MG/DL — LOW (ref 70–99)
GLUCOSE BLDC GLUCOMTR-MCNC: 80 MG/DL — SIGNIFICANT CHANGE UP (ref 70–99)
HCT VFR BLD CALC: 42.2 % — SIGNIFICANT CHANGE UP (ref 34.5–45)
HGB BLD-MCNC: 13.7 G/DL — SIGNIFICANT CHANGE UP (ref 11.5–15.5)
MCHC RBC-ENTMCNC: 29.6 PG — SIGNIFICANT CHANGE UP (ref 27–34)
MCHC RBC-ENTMCNC: 32.5 % — SIGNIFICANT CHANGE UP (ref 32–36)
MCV RBC AUTO: 91.1 FL — SIGNIFICANT CHANGE UP (ref 80–100)
NRBC # FLD: 0 K/UL — SIGNIFICANT CHANGE UP (ref 0–0)
PLATELET # BLD AUTO: 193 K/UL — SIGNIFICANT CHANGE UP (ref 150–400)
PMV BLD: 11.2 FL — SIGNIFICANT CHANGE UP (ref 7–13)
RBC # BLD: 4.63 M/UL — SIGNIFICANT CHANGE UP (ref 3.8–5.2)
RBC # FLD: 13.2 % — SIGNIFICANT CHANGE UP (ref 10.3–14.5)
RH IG SCN BLD-IMP: POSITIVE — SIGNIFICANT CHANGE UP
WBC # BLD: 8.84 K/UL — SIGNIFICANT CHANGE UP (ref 3.8–10.5)
WBC # FLD AUTO: 8.84 K/UL — SIGNIFICANT CHANGE UP (ref 3.8–10.5)

## 2019-12-05 RX ORDER — HYDROMORPHONE HYDROCHLORIDE 2 MG/ML
1 INJECTION INTRAMUSCULAR; INTRAVENOUS; SUBCUTANEOUS
Refills: 0 | Status: DISCONTINUED | OUTPATIENT
Start: 2019-12-05 | End: 2019-12-05

## 2019-12-05 RX ORDER — OXYTOCIN 10 UNIT/ML
333.33 VIAL (ML) INJECTION
Qty: 20 | Refills: 0 | Status: DISCONTINUED | OUTPATIENT
Start: 2019-12-05 | End: 2019-12-06

## 2019-12-05 RX ORDER — FAMOTIDINE 10 MG/ML
20 INJECTION INTRAVENOUS ONCE
Refills: 0 | Status: COMPLETED | OUTPATIENT
Start: 2019-12-05 | End: 2019-12-05

## 2019-12-05 RX ORDER — NALOXONE HYDROCHLORIDE 4 MG/.1ML
0.1 SPRAY NASAL
Refills: 0 | Status: DISCONTINUED | OUTPATIENT
Start: 2019-12-05 | End: 2019-12-06

## 2019-12-05 RX ORDER — GLYCERIN ADULT
1 SUPPOSITORY, RECTAL RECTAL AT BEDTIME
Refills: 0 | Status: DISCONTINUED | OUTPATIENT
Start: 2019-12-05 | End: 2019-12-08

## 2019-12-05 RX ORDER — ONDANSETRON 8 MG/1
4 TABLET, FILM COATED ORAL EVERY 6 HOURS
Refills: 0 | Status: DISCONTINUED | OUTPATIENT
Start: 2019-12-05 | End: 2019-12-06

## 2019-12-05 RX ORDER — ACETAMINOPHEN 500 MG
975 TABLET ORAL
Refills: 0 | Status: DISCONTINUED | OUTPATIENT
Start: 2019-12-05 | End: 2019-12-08

## 2019-12-05 RX ORDER — SIMETHICONE 80 MG/1
80 TABLET, CHEWABLE ORAL EVERY 4 HOURS
Refills: 0 | Status: DISCONTINUED | OUTPATIENT
Start: 2019-12-05 | End: 2019-12-08

## 2019-12-05 RX ORDER — KETOROLAC TROMETHAMINE 30 MG/ML
30 SYRINGE (ML) INJECTION EVERY 6 HOURS
Refills: 0 | Status: DISCONTINUED | OUTPATIENT
Start: 2019-12-05 | End: 2019-12-06

## 2019-12-05 RX ORDER — OXYCODONE HYDROCHLORIDE 5 MG/1
5 TABLET ORAL ONCE
Refills: 0 | Status: DISCONTINUED | OUTPATIENT
Start: 2019-12-05 | End: 2019-12-08

## 2019-12-05 RX ORDER — HEPARIN SODIUM 5000 [USP'U]/ML
5000 INJECTION INTRAVENOUS; SUBCUTANEOUS EVERY 12 HOURS
Refills: 0 | Status: DISCONTINUED | OUTPATIENT
Start: 2019-12-05 | End: 2019-12-08

## 2019-12-05 RX ORDER — IBUPROFEN 200 MG
600 TABLET ORAL EVERY 6 HOURS
Refills: 0 | Status: COMPLETED | OUTPATIENT
Start: 2019-12-05 | End: 2020-11-02

## 2019-12-05 RX ORDER — OXYCODONE HYDROCHLORIDE 5 MG/1
10 TABLET ORAL
Refills: 0 | Status: DISCONTINUED | OUTPATIENT
Start: 2019-12-05 | End: 2019-12-06

## 2019-12-05 RX ORDER — OXYCODONE HYDROCHLORIDE 5 MG/1
5 TABLET ORAL
Refills: 0 | Status: DISCONTINUED | OUTPATIENT
Start: 2019-12-05 | End: 2019-12-08

## 2019-12-05 RX ORDER — LANOLIN
1 OINTMENT (GRAM) TOPICAL EVERY 6 HOURS
Refills: 0 | Status: DISCONTINUED | OUTPATIENT
Start: 2019-12-05 | End: 2019-12-08

## 2019-12-05 RX ORDER — TETANUS TOXOID, REDUCED DIPHTHERIA TOXOID AND ACELLULAR PERTUSSIS VACCINE, ADSORBED 5; 2.5; 8; 8; 2.5 [IU]/.5ML; [IU]/.5ML; UG/.5ML; UG/.5ML; UG/.5ML
0.5 SUSPENSION INTRAMUSCULAR ONCE
Refills: 0 | Status: DISCONTINUED | OUTPATIENT
Start: 2019-12-05 | End: 2019-12-08

## 2019-12-05 RX ORDER — CITRIC ACID/SODIUM CITRATE 300-500 MG
30 SOLUTION, ORAL ORAL ONCE
Refills: 0 | Status: COMPLETED | OUTPATIENT
Start: 2019-12-05 | End: 2019-12-05

## 2019-12-05 RX ORDER — SODIUM CHLORIDE 9 MG/ML
1000 INJECTION, SOLUTION INTRAVENOUS
Refills: 0 | Status: DISCONTINUED | OUTPATIENT
Start: 2019-12-05 | End: 2019-12-06

## 2019-12-05 RX ORDER — METOCLOPRAMIDE HCL 10 MG
10 TABLET ORAL ONCE
Refills: 0 | Status: COMPLETED | OUTPATIENT
Start: 2019-12-05 | End: 2019-12-05

## 2019-12-05 RX ORDER — DIPHENHYDRAMINE HCL 50 MG
25 CAPSULE ORAL EVERY 6 HOURS
Refills: 0 | Status: DISCONTINUED | OUTPATIENT
Start: 2019-12-05 | End: 2019-12-08

## 2019-12-05 RX ORDER — OXYCODONE HYDROCHLORIDE 5 MG/1
5 TABLET ORAL
Refills: 0 | Status: DISCONTINUED | OUTPATIENT
Start: 2019-12-05 | End: 2019-12-06

## 2019-12-05 RX ORDER — SODIUM CHLORIDE 9 MG/ML
1000 INJECTION, SOLUTION INTRAVENOUS ONCE
Refills: 0 | Status: COMPLETED | OUTPATIENT
Start: 2019-12-05 | End: 2019-12-05

## 2019-12-05 RX ORDER — MAGNESIUM HYDROXIDE 400 MG/1
30 TABLET, CHEWABLE ORAL
Refills: 0 | Status: DISCONTINUED | OUTPATIENT
Start: 2019-12-05 | End: 2019-12-08

## 2019-12-05 RX ORDER — ASPIRIN/CALCIUM CARB/MAGNESIUM 324 MG
2 TABLET ORAL
Qty: 0 | Refills: 0 | DISCHARGE

## 2019-12-05 RX ADMIN — SODIUM CHLORIDE 75 MILLILITER(S): 9 INJECTION, SOLUTION INTRAVENOUS at 13:37

## 2019-12-05 RX ADMIN — SODIUM CHLORIDE 2000 MILLILITER(S): 9 INJECTION, SOLUTION INTRAVENOUS at 07:15

## 2019-12-05 RX ADMIN — Medication 30 MILLIGRAM(S): at 21:05

## 2019-12-05 RX ADMIN — Medication 1000 MILLIUNIT(S)/MIN: at 13:37

## 2019-12-05 RX ADMIN — Medication 10 MILLIGRAM(S): at 07:14

## 2019-12-05 RX ADMIN — HEPARIN SODIUM 5000 UNIT(S): 5000 INJECTION INTRAVENOUS; SUBCUTANEOUS at 17:43

## 2019-12-05 RX ADMIN — SODIUM CHLORIDE 125 MILLILITER(S): 9 INJECTION, SOLUTION INTRAVENOUS at 07:15

## 2019-12-05 RX ADMIN — Medication 30 MILLIGRAM(S): at 21:40

## 2019-12-05 RX ADMIN — FAMOTIDINE 20 MILLIGRAM(S): 10 INJECTION INTRAVENOUS at 07:14

## 2019-12-05 RX ADMIN — Medication 30 MILLILITER(S): at 07:15

## 2019-12-05 NOTE — OB PROVIDER H&P - NSHPLABSRESULTS_GEN_ALL_CORE
Complete Blood Count (11.22.19 @ 15:30)    WBC Count: 8.35 K/uL    Hemoglobin: 13.4: Delta: 8.6 on 10/20/  Delta: 8.6 on 10/20/ g/dL    Platelet Count - Automated: 219: Delta: 136 on 10/20/      Type + Screen (11.22.19 @ 14:59)    ABO Interpretation: B    Rh Interpretation: Positive    Antibody Screen: Negative

## 2019-12-05 NOTE — OB PROVIDER H&P - NSHPPHYSICALEXAM_GEN_ALL_CORE
T(C): --  HR: --  BP: 132/97 (12-05-19 @ 07:03) (132/97 - 132/97)  RR: --  SpO2: --      FS:80 on admission  A&Ox3  Heart: RRR, S1&S2, no S3  Lungs: Clear bilateral to auscultation, good inspiratory /expiratory effort              no rhonchi, no rales  Abd: soft, Gravid, TOCO in place           +pfannenstial scar/keloid  EFM:  130 bpm/moderate variabilty/+accelerations/no decelerations/CAT 1  TOCO:  no UC  Ext:  FROM / minimal edema   Neuro: grossly intact T(C): --  HR: --  BP: 132/97 (12-05-19 @ 07:03) (132/97 - 132/97)  RR: --  SpO2: --      FS:80 on admission  A&Ox3  Heart: RRR, S1&S2, no S3  Lungs: Clear bilateral to auscultation, good inspiratory /expiratory effort              no rhonchi, no rales  Abd: soft, Gravid, TOCO in place           +pfannenstial scar/keloid  EFM:  130 bpm/moderate variabilty/+accelerations/no decelerations/CAT 1  TOCO:  no UC  Ext:  FROM / minimal edema   Neuro: grossly intact    BSUS L fundal placenta, Vertex presentation performed by Dr. RAINE Sargent, PGY2

## 2019-12-05 NOTE — DISCHARGE NOTE OB - CARE PLAN
Principal Discharge DX:	 delivery delivered  Goal:	recovery  Assessment and plan of treatment:	recovery Principal Discharge DX:	 delivery delivered  Goal:	recovery  Assessment and plan of treatment:	recovery  Secondary Diagnosis:	DM (diabetes mellitus)  Assessment and plan of treatment:	Patient to continue fingersticks QID, keep strict glucose log  If patient has FBS greater than 90, PP greater than 140 pt should f/u in office this week to review log  PT to have PCP follow up treatment and monitoring

## 2019-12-05 NOTE — DISCHARGE NOTE OB - PLAN OF CARE
recovery Patient to continue fingersticks QID, keep strict glucose log  If patient has FBS greater than 90, PP greater than 140 pt should f/u in office this week to review log  PT to have PCP follow up treatment and monitoring

## 2019-12-05 NOTE — OB PROVIDER H&P - ASSESSMENT
Admit to L&D  sherri Mati/MD Thee  NPO  routine labs  EFM/TOCO  anesthesia consult  Neda Catalan PAC  12-05-19 @ 07:09

## 2019-12-05 NOTE — OB NEONATOLOGY/PEDIATRICIAN DELIVERY SUMMARY - NSPEDSNEONOTESA_OBGYN_ALL_OB_FT
39 year old  at 40 weeks gestation. Pregnancy complicated by gestational diabetes on insulin, well controlled. MBT B+, PNL NNI, GBS negative. ROM at delivery with clear fluid. Infant emerged vigorous and received routine recuscitation. Apgars 8/8.

## 2019-12-05 NOTE — DISCHARGE NOTE OB - CARE PROVIDER_API CALL
Sherri Carballo)  Obstetrics and Gynecology Obstetrics and Gynocology  [address] [place] [address]  Phone: [phone]  Fax: [phone]  Follow Up Time:

## 2019-12-05 NOTE — PROGRESS NOTE ADULT - SUBJECTIVE AND OBJECTIVE BOX
pt counselled on multiple adhesions and mid transverse classical CS. advised against future trial of labor. r/b/a dw pt

## 2019-12-05 NOTE — DISCHARGE NOTE OB - PATIENT PORTAL LINK FT
You can access the FollowMyHealth Patient Portal offered by [facility] by registering at the following website: [email or web address]. By joining [employer]’s FollowMyHealth portal, you will also be able to view your health information using other applications (apps) compatible with our system.

## 2019-12-05 NOTE — OB PROVIDER DELIVERY SUMMARY - NSPROVIDERDELIVERYNOTE_OBGYN_ALL_OB_FT
Liveborn infant, apgars 8/8, vertex presentation. Dense anterior vesicouterine/abdominal wall adhesions over  YEMI. Bilateral tubes and ovaries not visualized as uterus not externalized or well mobilized 2/2 aforementioned adhesions.

## 2019-12-05 NOTE — OB PROVIDER H&P - HISTORY OF PRESENT ILLNESS
40yo  female  EDC/ presents@40 wks for scheduled  rltcs. IUI pregnancy  +AP course GDMA2 on Levimir, otherwise unremarkable.   Denies VB,ROM or UCs today.  +FM

## 2019-12-06 LAB
BASOPHILS # BLD AUTO: 0.02 K/UL — SIGNIFICANT CHANGE UP (ref 0–0.2)
BASOPHILS NFR BLD AUTO: 0.2 % — SIGNIFICANT CHANGE UP (ref 0–2)
EOSINOPHIL # BLD AUTO: 0.05 K/UL — SIGNIFICANT CHANGE UP (ref 0–0.5)
EOSINOPHIL NFR BLD AUTO: 0.5 % — SIGNIFICANT CHANGE UP (ref 0–6)
GLUCOSE BLDC GLUCOMTR-MCNC: 106 MG/DL — HIGH (ref 70–99)
GLUCOSE BLDC GLUCOMTR-MCNC: 94 MG/DL — SIGNIFICANT CHANGE UP (ref 70–99)
HCT VFR BLD CALC: 28.5 % — LOW (ref 34.5–45)
HGB BLD-MCNC: 9.6 G/DL — LOW (ref 11.5–15.5)
IMM GRANULOCYTES NFR BLD AUTO: 0.3 % — SIGNIFICANT CHANGE UP (ref 0–1.5)
LYMPHOCYTES # BLD AUTO: 0.99 K/UL — LOW (ref 1–3.3)
LYMPHOCYTES # BLD AUTO: 10.6 % — LOW (ref 13–44)
MCHC RBC-ENTMCNC: 29.8 PG — SIGNIFICANT CHANGE UP (ref 27–34)
MCHC RBC-ENTMCNC: 33.7 % — SIGNIFICANT CHANGE UP (ref 32–36)
MCV RBC AUTO: 88.5 FL — SIGNIFICANT CHANGE UP (ref 80–100)
MONOCYTES # BLD AUTO: 0.63 K/UL — SIGNIFICANT CHANGE UP (ref 0–0.9)
MONOCYTES NFR BLD AUTO: 6.8 % — SIGNIFICANT CHANGE UP (ref 2–14)
NEUTROPHILS # BLD AUTO: 7.58 K/UL — HIGH (ref 1.8–7.4)
NEUTROPHILS NFR BLD AUTO: 81.6 % — HIGH (ref 43–77)
NRBC # FLD: 0 K/UL — SIGNIFICANT CHANGE UP (ref 0–0)
PLATELET # BLD AUTO: 148 K/UL — LOW (ref 150–400)
PMV BLD: 11 FL — SIGNIFICANT CHANGE UP (ref 7–13)
RBC # BLD: 3.22 M/UL — LOW (ref 3.8–5.2)
RBC # FLD: 13.2 % — SIGNIFICANT CHANGE UP (ref 10.3–14.5)
WBC # BLD: 9.3 K/UL — SIGNIFICANT CHANGE UP (ref 3.8–10.5)
WBC # FLD AUTO: 9.3 K/UL — SIGNIFICANT CHANGE UP (ref 3.8–10.5)

## 2019-12-06 RX ORDER — INSULIN DETEMIR 100/ML (3)
28 INSULIN PEN (ML) SUBCUTANEOUS
Qty: 0 | Refills: 0 | DISCHARGE

## 2019-12-06 RX ORDER — SENNA PLUS 8.6 MG/1
2 TABLET ORAL AT BEDTIME
Refills: 0 | Status: DISCONTINUED | OUTPATIENT
Start: 2019-12-06 | End: 2019-12-08

## 2019-12-06 RX ORDER — IBUPROFEN 200 MG
600 TABLET ORAL EVERY 6 HOURS
Refills: 0 | Status: DISCONTINUED | OUTPATIENT
Start: 2019-12-06 | End: 2019-12-08

## 2019-12-06 RX ORDER — FERROUS SULFATE 325(65) MG
325 TABLET ORAL THREE TIMES A DAY
Refills: 0 | Status: DISCONTINUED | OUTPATIENT
Start: 2019-12-06 | End: 2019-12-08

## 2019-12-06 RX ORDER — SIMETHICONE 80 MG/1
1 TABLET, CHEWABLE ORAL
Qty: 0 | Refills: 0 | DISCHARGE
Start: 2019-12-06

## 2019-12-06 RX ORDER — ASCORBIC ACID 60 MG
500 TABLET,CHEWABLE ORAL DAILY
Refills: 0 | Status: DISCONTINUED | OUTPATIENT
Start: 2019-12-06 | End: 2019-12-08

## 2019-12-06 RX ORDER — IBUPROFEN 200 MG
1 TABLET ORAL
Qty: 0 | Refills: 0 | DISCHARGE
Start: 2019-12-06

## 2019-12-06 RX ORDER — ACETAMINOPHEN 500 MG
3 TABLET ORAL
Qty: 0 | Refills: 0 | DISCHARGE
Start: 2019-12-06

## 2019-12-06 RX ADMIN — OXYCODONE HYDROCHLORIDE 5 MILLIGRAM(S): 5 TABLET ORAL at 01:09

## 2019-12-06 RX ADMIN — Medication 30 MILLIGRAM(S): at 12:35

## 2019-12-06 RX ADMIN — Medication 500 MILLIGRAM(S): at 12:03

## 2019-12-06 RX ADMIN — Medication 30 MILLIGRAM(S): at 06:27

## 2019-12-06 RX ADMIN — Medication 30 MILLIGRAM(S): at 12:03

## 2019-12-06 RX ADMIN — Medication 1 TABLET(S): at 12:03

## 2019-12-06 RX ADMIN — Medication 975 MILLIGRAM(S): at 16:45

## 2019-12-06 RX ADMIN — OXYCODONE HYDROCHLORIDE 10 MILLIGRAM(S): 5 TABLET ORAL at 08:14

## 2019-12-06 RX ADMIN — OXYCODONE HYDROCHLORIDE 10 MILLIGRAM(S): 5 TABLET ORAL at 12:35

## 2019-12-06 RX ADMIN — OXYCODONE HYDROCHLORIDE 5 MILLIGRAM(S): 5 TABLET ORAL at 01:40

## 2019-12-06 RX ADMIN — Medication 975 MILLIGRAM(S): at 08:45

## 2019-12-06 RX ADMIN — OXYCODONE HYDROCHLORIDE 10 MILLIGRAM(S): 5 TABLET ORAL at 12:02

## 2019-12-06 RX ADMIN — Medication 30 MILLIGRAM(S): at 07:00

## 2019-12-06 RX ADMIN — Medication 975 MILLIGRAM(S): at 08:15

## 2019-12-06 RX ADMIN — HEPARIN SODIUM 5000 UNIT(S): 5000 INJECTION INTRAVENOUS; SUBCUTANEOUS at 06:27

## 2019-12-06 RX ADMIN — OXYCODONE HYDROCHLORIDE 10 MILLIGRAM(S): 5 TABLET ORAL at 08:45

## 2019-12-06 RX ADMIN — Medication 325 MILLIGRAM(S): at 12:04

## 2019-12-06 RX ADMIN — Medication 975 MILLIGRAM(S): at 01:09

## 2019-12-06 RX ADMIN — Medication 975 MILLIGRAM(S): at 01:40

## 2019-12-06 RX ADMIN — HEPARIN SODIUM 5000 UNIT(S): 5000 INJECTION INTRAVENOUS; SUBCUTANEOUS at 18:29

## 2019-12-06 RX ADMIN — Medication 975 MILLIGRAM(S): at 16:10

## 2019-12-06 NOTE — PROGRESS NOTE ADULT - SUBJECTIVE AND OBJECTIVE BOX
OB Progress Note:  Delivery, POD#1    S: 38yo POD#1 s/p LTCS for rLTCS. Her pain is well controlled, currently on toradol. She is tolerating a regular diet. Not yet passing flatus. Denies N/V. She is ambulating without difficulty. Voiding spontaneously.    O:   Vital Signs Last 24 Hrs  T(C): 36.7 (06 Dec 2019 05:18), Max: 36.9 (05 Dec 2019 14:30)  T(F): 98 (06 Dec 2019 05:18), Max: 98.4 (05 Dec 2019 14:30)  HR: 83 (06 Dec 2019 05:18) (63 - 93)  BP: 121/64 (06 Dec 2019 05:18) (73/48 - 139/57)  BP(mean): 71 (05 Dec 2019 13:00) (53 - 86)  RR: 17 (06 Dec 2019 05:18) (16 - 25)  SpO2: 98% (06 Dec 2019 05:18) (95% - 100%)    Physical exam:    Labs:  Blood type: B Positive  Rubella IgG: RPR: Negative                          9.6<L>   9.30 >-----------< 148<L>    (  @ 05:15 )             28.5<L>                        13.7   8.84 >-----------< 193    (  @ 06:45 )             42.2                    A/P: 38yo POD#1 s/p LTCS for ***. Normal EBL.  Patient is stable and doing well post-operatively.    - Continue regular diet.  - Increase ambulation.  - Continue toradol for pain control.  - F/u AM CBC OB Progress Note:  Delivery, POD#1    S: 38yo POD#1 s/p LTCS for rLTCS. Her pain is well controlled, currently on toradol. She is tolerating a regular diet. Not yet passing flatus. Denies N/V. She is ambulating without difficulty. Voiding spontaneously.    O:   Vital Signs Last 24 Hrs  T(C): 36.7 (06 Dec 2019 05:18), Max: 36.9 (05 Dec 2019 14:30)  T(F): 98 (06 Dec 2019 05:18), Max: 98.4 (05 Dec 2019 14:30)  HR: 83 (06 Dec 2019 05:18) (63 - 93)  BP: 121/64 (06 Dec 2019 05:18) (73/48 - 139/57)  BP(mean): 71 (05 Dec 2019 13:00) (53 - 86)  RR: 17 (06 Dec 2019 05:18) (16 - 25)  SpO2: 98% (06 Dec 2019 05:18) (95% - 100%)    Physical exam:    Labs:  Blood type: B Positive  Rubella IgG: RPR: Negative                          9.6<L>   9.30 >-----------< 148<L>    (  @ 05:15 )             28.5<L>                        13.7   8.84 >-----------< 193    (  @ 06:45 )             42.2                    A/P: 38yo POD#1 s/p LTCS for rLTCS. Patient is stable and doing well post-operatively.    - Continue regular diet.  - Increase ambulation.  - Continue toradol for pain control.  - F/u AM CBC MS3/R1 OB Progress Note:  Delivery, POD#1    S: 38yo POD#1 s/p rLTCS. Her pain is well controlled, currently on toradol. She is tolerating a regular diet. Not yet passing flatus. Denies N/V. She is ambulating without difficulty. Voiding spontaneously.    O:   Vital Signs Last 24 Hrs  T(C): 36.7 (06 Dec 2019 05:18), Max: 36.9 (05 Dec 2019 14:30)  HR: 83 (06 Dec 2019 05:18) (63 - 93)  BP: 121/64 (06 Dec 2019 05:18) (73/48 - 139/57)  BP(mean): 71 (05 Dec 2019 13:00) (53 - 86)  RR: 17 (06 Dec 2019 05:18) (16 - 25)  SpO2: 98% (06 Dec 2019 05:18) (95% - 100%)    Physical exam:  Gen: NAD  Abd: soft, nontender  Incision: c/d/i, steri strips  Ext: nontender, nonerythematous    Labs:  Blood type: B Positive  Rubella IgG: RPR: Negative                          9.6<L>   9.30 >-----------< 148<L>    (  @ 05:15 )             28.5<L>                        13.7   8.84 >-----------< 193    (  @ 06:45 )             42.2

## 2019-12-06 NOTE — PROGRESS NOTE ADULT - SUBJECTIVE AND OBJECTIVE BOX
NP Note: Post Op day #1  Patient seen at bedside resting comfortably offers no new complaints. not yet ambulating, pond just removed no void spontaneously yet.  + flatus;  no bm; tolerating regular diet both breast and bottle feeding. bonding well with  Denies HA, blurry vision or epigastric pain, CP, SOB, N/V/D,  dizziness, palpitations, worsening vaginal bleeding.    Vital Signs Last 24 Hrs  T(C): 36.7 (06 Dec 2019 05:18), Max: 36.9 (05 Dec 2019 14:30)  T(F): 98 (06 Dec 2019 05:18), Max: 98.4 (05 Dec 2019 14:30)  HR: 83 (06 Dec 2019 05:18) (73 - 93)  BP: 121/64 (06 Dec 2019 05:18) (73/48 - 139/57)  BP(mean): 71 (05 Dec 2019 13:00) (53 - 71)  RR: 17 (06 Dec 2019 05:18) (16 - 25)  SpO2: 98% (06 Dec 2019 05:18) (95% - 100%)    Gen: A&O x 3, NAD  Chest: CTA B/L  Cardiac: S1,S2  RRR  Breast: Soft, nontender, nonengorged  Abdomen: +BS; soft; Nontender, nondistended; dressing removed incision C/D/I steri strips in place  Gyn: minimal lochia   Extremities: Nontender, venodynes in place                          9.6    9.30  )-----------( 148      ( 06 Dec 2019 05:15 )             28.5       A/P: POD #1 s/p c/s Post op day #1  -Pain management as needed  -OOB and ambulate  - monitor for orthostasis, tachycardia: then rpt cbc  - f/u void  -Advance diet to regular  -Encourage breastfeeding   -d/w Robbie

## 2019-12-07 RX ADMIN — Medication 600 MILLIGRAM(S): at 05:28

## 2019-12-07 RX ADMIN — Medication 500 MILLIGRAM(S): at 12:55

## 2019-12-07 RX ADMIN — Medication 975 MILLIGRAM(S): at 00:32

## 2019-12-07 RX ADMIN — Medication 325 MILLIGRAM(S): at 12:55

## 2019-12-07 RX ADMIN — Medication 975 MILLIGRAM(S): at 01:10

## 2019-12-07 RX ADMIN — Medication 600 MILLIGRAM(S): at 00:31

## 2019-12-07 RX ADMIN — Medication 600 MILLIGRAM(S): at 12:55

## 2019-12-07 RX ADMIN — Medication 1 TABLET(S): at 12:55

## 2019-12-07 RX ADMIN — Medication 600 MILLIGRAM(S): at 13:25

## 2019-12-07 RX ADMIN — Medication 600 MILLIGRAM(S): at 01:10

## 2019-12-07 RX ADMIN — Medication 325 MILLIGRAM(S): at 17:05

## 2019-12-07 RX ADMIN — HEPARIN SODIUM 5000 UNIT(S): 5000 INJECTION INTRAVENOUS; SUBCUTANEOUS at 05:25

## 2019-12-07 RX ADMIN — Medication 600 MILLIGRAM(S): at 06:00

## 2019-12-07 RX ADMIN — HEPARIN SODIUM 5000 UNIT(S): 5000 INJECTION INTRAVENOUS; SUBCUTANEOUS at 17:05

## 2019-12-07 NOTE — PROGRESS NOTE ADULT - SUBJECTIVE AND OBJECTIVE BOX
Section/Postpartum    MARY ROBERT is a  39y woman , who is now post-operative day: 2    PAST MEDICAL & SURGICAL HISTORY:  Galactosemia: Galactosemia genetics  DM (diabetes mellitus): Class B  Pregnancy: Intrauterine insemination  AMA (advanced maternal age) multigravida 35+, third trimester  Abnormal cervical Papanicolaou smear, unspecified abnormal pap finding  Cyst of right ovary   delivery delivered: 2016 FT F 7#8 (C/S for OP and Non reassuring tracing )      Subjective:  The patient feels well.  She is ambulating.   She is tolerating regular diet.  She denies nausea and vomiting.  She is voiding.  Her pain is controlled.  She reports normal postpartum bleeding.  She is breastfeeding.  She is formula feeding.    Physical exam:    Vital Signs Last 24 Hrs  T(C): 36.6 (07 Dec 2019 05:58), Max: 36.7 (06 Dec 2019 14:29)  T(F): 97.8 (07 Dec 2019 05:58), Max: 98 (06 Dec 2019 14:29)  HR: 68 (07 Dec 2019 05:58) (68 - 98)  BP: 120/67 (07 Dec 2019 05:58) (120/67 - 137/69)  BP(mean): --  RR: 18 (07 Dec 2019 05:58) (16 - 18)  SpO2: 100% (07 Dec 2019 05:58) (99% - 100%)    General: alert and oriented in no acute distress.  Breast: Soft, nontender, not engorged.  Abdomen: Soft, nontender, not distended ,  Uterine fundus is firm and at below the umbilicus, BS (+), Flatus (+), Bowel Movement (+)  Incision: Clean, dry, and intact, bandage has been removed  Pelvic: Normal lochia noted  Ext: No calf tenderness  Lochia: not excessive  Lorenzana draining clear yellow urine    LABS:                        9.6    9.30  )-----------( 148      ( 06 Dec 2019 05:15 )             28.5       Rubella status:  Immune    Blood Type: Type + Screen (19 @ 06:28)    ABO Interpretation: B    Rh Interpretation: Positive    Antibody Screen: Negative                         Allergies    No Known Allergies    Intolerances        MEDICATIONS  (STANDING):  acetaminophen   Tablet .. 975 milliGRAM(s) Oral <User Schedule>  ascorbic acid 500 milliGRAM(s) Oral daily  diphtheria/tetanus/pertussis (acellular) Vaccine (ADAcel) 0.5 milliLiter(s) IntraMuscular once  ferrous    sulfate 325 milliGRAM(s) Oral three times a day  heparin  Injectable 5000 Unit(s) SubCutaneous every 12 hours  ibuprofen  Tablet. 600 milliGRAM(s) Oral every 6 hours  prenatal multivitamin 1 Tablet(s) Oral daily    MEDICATIONS  (PRN):  diphenhydrAMINE 25 milliGRAM(s) Oral every 6 hours PRN Itching  glycerin Suppository - Adult 1 Suppository(s) Rectal at bedtime PRN Constipation  lanolin Ointment 1 Application(s) Topical every 6 hours PRN Sore Nipples  magnesium hydroxide Suspension 30 milliLiter(s) Oral two times a day PRN Constipation  oxyCODONE    IR 5 milliGRAM(s) Oral every 3 hours PRN Moderate to Severe Pain (4-10)  oxyCODONE    IR 5 milliGRAM(s) Oral once PRN Moderate to Severe Pain (4-10)  senna 2 Tablet(s) Oral at bedtime PRN Constipation  simethicone 80 milliGRAM(s) Chew every 4 hours PRN Gas        Assessment and Plan:    POD # 2  s/p RCS       Doing well.  Encourage ambulation, may shower, routine postop care

## 2019-12-07 NOTE — PROGRESS NOTE ADULT - SUBJECTIVE AND OBJECTIVE BOX
Patient assessed at 1013.  Subjective  Pain: Patient denies any pain at the time of assessment. Pain being managed well by pain management protocol.  Complaints: None. Patient denies any headache, blur vision, dizziness and/or weakness/fatigue. Patient denies having elevated blood pressure during this pregnancy and/or with previous pregnancy.   Milestones:  Alert and orientedx3. Out of bed ambulating. Positive flatus. Negative bowel movement. Voiding.  Tolerating a regular diet.  Infant feeding: breastfeeding  Feeding related issues and/or concerns: none    OBJECTIVE:  T(C): 36.6 (19 @ 05:58), Max: 36.7 (19 @ 14:29)  HR: 68 (19 @ 05:58) (68 - 98)  BP: 120/67 (19 @ 05:58) (120/67 - 137/69)  RR: 18 (19 @ 05:58) (16 - 18)  SpO2: 100% (19 @ 05:58) (99% - 100%)  Wt(kg): --                        9.6    9.30  )-----------( 148      ( 06 Dec 2019 05:15 )             28.5           Blood Type: B Positive    RPR: Negative    Rubella Immune          MEDICATIONS  (STANDING):  acetaminophen   Tablet .. 975 milliGRAM(s) Oral <User Schedule>  ascorbic acid 500 milliGRAM(s) Oral daily  diphtheria/tetanus/pertussis (acellular) Vaccine (ADAcel) 0.5 milliLiter(s) IntraMuscular once  ferrous    sulfate 325 milliGRAM(s) Oral three times a day  heparin  Injectable 5000 Unit(s) SubCutaneous every 12 hours  ibuprofen  Tablet. 600 milliGRAM(s) Oral every 6 hours  prenatal multivitamin 1 Tablet(s) Oral daily    MEDICATIONS  (PRN):  diphenhydrAMINE 25 milliGRAM(s) Oral every 6 hours PRN Itching  glycerin Suppository - Adult 1 Suppository(s) Rectal at bedtime PRN Constipation  lanolin Ointment 1 Application(s) Topical every 6 hours PRN Sore Nipples  magnesium hydroxide Suspension 30 milliLiter(s) Oral two times a day PRN Constipation  oxyCODONE    IR 5 milliGRAM(s) Oral every 3 hours PRN Moderate to Severe Pain (4-10)  oxyCODONE    IR 5 milliGRAM(s) Oral once PRN Moderate to Severe Pain (4-10)  senna 2 Tablet(s) Oral at bedtime PRN Constipation  simethicone 80 milliGRAM(s) Chew every 4 hours PRN Gas        ASSESSMENT:  40y/o     Day#2    repeat post-operative  section delivery with adhesions at delivery  Condition: Stable  Past Medical/Surgical/OB/GYN History significant for: IUI pregnancy, gestational diabetes was on Levemir finger sticks postpartum 69, 110, 94, 106, positive galactosemia genetics, abnormal pap, right ovarian cyst,  section 2016.   Current Issues: EBL 700cc, slight anemia. highest blood pressure 137/69 ( 06 Dec 2019 @ 2200) asymptomatic with blood pressure baseline 120s/60s.  Lung sounds clear bilaterally.  Breasts: العلي, nontender  Nipples: intact  Abdomen: Soft, nondistended and nontender. Bowel sounds present. Fundus firm  Abdominal incision: Clean, dry and intact with steri strips.   Vaginal: Lochia light rubra  Extremities: Moderate edema noted bilaterally and equal to lower extremities, nontender with no erythremic areas noted. Positive pedal pulses. No palpable veins noted  Other relevant physical exam findings: none    Plan  Will continue to monitor blood pressure.  Glucose monitoring at 6weeks due to history of gestational diabetes on Levemir   Continue routine post-operative and postpartum care  Increase ambulation, analgesia PRN and pain medication protocol of standing ibuprofen and acetaminophen and oxycodone prn  Encouraged to wear abdominal binder for support and to use incentive spirometer  Discussed iron supplementation, increase hydration, dietary implementation and signs/symptoms to report due to slight anemia.   Discussed breast/nipple/engorgement care for a breastfeeding mother.

## 2019-12-07 NOTE — PROGRESS NOTE ADULT - PROBLEM SELECTOR PLAN 1
routine postop care
- Continue regular diet.  - Increase ambulation.  - Adequate pain control on current regimen - transition to PO meds  - F/u AM CBC    Pt seen with Garrett Hoffman MS-3  Dagmar Owens PGY-1

## 2019-12-07 NOTE — PROGRESS NOTE ADULT - ASSESSMENT
Gen: A&O x 3, NAD  Chest: CTA B/L  Cardiac: S1,S2  RRR  Breast: Soft, nontender, nonengorged  Abdomen: +BS; soft; Nontender, nondistended; dressing removed incision C/D/I steri strips in place  Gyn: minimal lochia   Extremities: Nontender, denies calf pain, OOB to chair. Mild Bilateral nonpitting edema
stable
38yo POD#1 s/p rLTCS. Patient is stable and doing well post-operatively.

## 2019-12-08 VITALS
RESPIRATION RATE: 17 BRPM | SYSTOLIC BLOOD PRESSURE: 129 MMHG | OXYGEN SATURATION: 99 % | DIASTOLIC BLOOD PRESSURE: 66 MMHG | HEART RATE: 77 BPM | TEMPERATURE: 98 F

## 2019-12-08 RX ADMIN — Medication 600 MILLIGRAM(S): at 10:12

## 2019-12-08 RX ADMIN — HEPARIN SODIUM 5000 UNIT(S): 5000 INJECTION INTRAVENOUS; SUBCUTANEOUS at 05:19

## 2019-12-08 RX ADMIN — Medication 325 MILLIGRAM(S): at 10:12

## 2019-12-08 RX ADMIN — Medication 600 MILLIGRAM(S): at 11:12

## 2019-12-08 NOTE — PROGRESS NOTE ADULT - SUBJECTIVE AND OBJECTIVE BOX
Patient assessed at 0940.  Subjective  Pain: Patient denies any pain at the time of assessment. Pain being managed well by pain management protocol.  Complaints: None. Patient denies any headache, blur vision, dizziness and/or weakness/fatigue. Patient denies having elevated blood pressure during this pregnancy and/or with previous pregnancy. Patient states she has a electronic blood pressure cuff at home. Patient reports not taking pain medication overnight and didn't sleep much overnight.   Milestones:  Alert and orientedx3. Out of bed ambulating. Positive flatus. Patient bowel movement. Voiding.  Tolerating a regular diet.  Infant feeding: breastfeeding and using breast pump at home.   Feeding related issues and/or concerns: none    Vital Signs Last 24 Hrs  T(C): 36.8 (08 Dec 2019 05:31), Max: 36.8 (07 Dec 2019 22:00)  T(F): 98.2 (08 Dec 2019 05:31), Max: 98.2 (07 Dec 2019 22:00)  HR: 85 (08 Dec 2019 05:31) (85 - 87)  BP: 133/68 (08 Dec 2019 05:31) (133/68 - 133/69)  BP(mean): --  RR: 16 (08 Dec 2019 05:31) (16 - 16)  SpO2: 99% (08 Dec 2019 05:31) (99% - 100%)  Wt(kg): --    LABS:                        9.6    9.30  )-----------( 148      ( 06 Dec 2019 05:15 )             28.5       Blood Type: B Positive    RPR: Negative    Rubella Immune        MEDICATIONS  (STANDING):  acetaminophen   Tablet .. 975 milliGRAM(s) Oral <User Schedule>  ascorbic acid 500 milliGRAM(s) Oral daily  diphtheria/tetanus/pertussis (acellular) Vaccine (ADAcel) 0.5 milliLiter(s) IntraMuscular once  ferrous    sulfate 325 milliGRAM(s) Oral three times a day  heparin  Injectable 5000 Unit(s) SubCutaneous every 12 hours  ibuprofen  Tablet. 600 milliGRAM(s) Oral every 6 hours  prenatal multivitamin 1 Tablet(s) Oral daily    MEDICATIONS  (PRN):  diphenhydrAMINE 25 milliGRAM(s) Oral every 6 hours PRN Itching  glycerin Suppository - Adult 1 Suppository(s) Rectal at bedtime PRN Constipation  lanolin Ointment 1 Application(s) Topical every 6 hours PRN Sore Nipples  magnesium hydroxide Suspension 30 milliLiter(s) Oral two times a day PRN Constipation  oxyCODONE    IR 5 milliGRAM(s) Oral every 3 hours PRN Moderate to Severe Pain (4-10)  oxyCODONE    IR 5 milliGRAM(s) Oral once PRN Moderate to Severe Pain (4-10)  senna 2 Tablet(s) Oral at bedtime PRN Constipation  simethicone 80 milliGRAM(s) Chew every 4 hours PRN Gas        ASSESSMENT:  40y/o     Day#3    repeat post-operative  section delivery with adhesions at delivery  Condition: Stable  Past Medical/Surgical/OB/GYN History significant for: IUI pregnancy, gestational diabetes was on Levemir finger sticks postpartum 69, 110, 94, 106, positive galactosemia genetics, abnormal pap, right ovarian cyst,  section 2016.   Current Issues: EBL 700cc, slight anemia. BP 130s/60s asymptomatic .  Lung sounds clear bilaterally.  Breasts: العلي, nontender  Nipples: intact  Abdomen: Soft, nondistended and nontender. Bowel sounds present. Fundus firm  Abdominal incision: Clean, dry and intact with steri strips.   Vaginal: Lochia light rubra  Extremities: Moderate edema noted bilaterally and equal to lower extremities, nontender with no erythremic areas noted. Positive pedal pulses. No palpable veins noted  Other relevant physical exam findings: none    Plan  Pain medication to be given and recheck blood pressure s/p medication before discharge.   Glucose monitoring at 6weeks due to history of gestational diabetes on Levemir   Continue routine post-operative and postpartum care  Increase ambulation, analgesia PRN and pain medication protocol of standing ibuprofen and acetaminophen and oxycodone prn  Encouraged to wear abdominal binder for support and to use incentive spirometer  Discussed iron supplementation, increase hydration, dietary implementation and signs/symptoms to report due to slight anemia.   Discussed breast/nipple/engorgement, breastfeeding and use of breastpump.   Discharge to home today. Discussed discharge planning.

## 2019-12-18 DIAGNOSIS — O24.414 GESTATIONAL DIABETES MELLITUS IN PREGNANCY, INSULIN CONTROLLED: ICD-10-CM

## 2019-12-18 DIAGNOSIS — O40.3XX0 POLYHYDRAMNIOS, THIRD TRIMESTER, NOT APPLICABLE OR UNSPECIFIED: ICD-10-CM

## 2019-12-18 DIAGNOSIS — O09.523 SUPERVISION OF ELDERLY MULTIGRAVIDA, THIRD TRIMESTER: ICD-10-CM

## 2020-01-09 DIAGNOSIS — O09.523 SUPERVISION OF ELDERLY MULTIGRAVIDA, THIRD TRIMESTER: ICD-10-CM

## 2020-01-09 DIAGNOSIS — O24.414 GESTATIONAL DIABETES MELLITUS IN PREGNANCY, INSULIN CONTROLLED: ICD-10-CM

## 2020-01-17 DIAGNOSIS — O36.8330 MATERNAL CARE FOR ABNORMALITIES OF THE FETAL HEART RATE OR RHYTHM, THIRD TRIMESTER, NOT APPLICABLE OR UNSPECIFIED: ICD-10-CM

## 2020-01-17 DIAGNOSIS — O09.523 SUPERVISION OF ELDERLY MULTIGRAVIDA, THIRD TRIMESTER: ICD-10-CM

## 2020-01-17 DIAGNOSIS — O24.414 GESTATIONAL DIABETES MELLITUS IN PREGNANCY, INSULIN CONTROLLED: ICD-10-CM

## 2020-03-12 NOTE — DISCHARGE NOTE OB - MEDICATION SUMMARY - MEDICATIONS TO TAKE
Other Procedure: biopsy x 2
Introduction Text (Please End With A Colon): The following procedure was deferred:
Detail Level: Simple
I will START or STAY ON the medications listed below when I get home from the hospital:    acetaminophen 325 mg oral tablet  -- 3 tab(s) by mouth every 4 to 6 hours, As Needed  -- Indication: For  delivery delivered    ibuprofen 600 mg oral tablet  -- 1 tab(s) by mouth every 6 hours, As Needed  -- Indication: For  delivery delivered    Prenatal Multivitamins oral capsule  -- 1 tab(s) by mouth once a day  last dose   -- Indication: For  delivery delivered    ferrous sulfate 325 mg (65 mg elemental iron) oral tablet  -- 1 tab(s) by mouth 2 times a day  -- Indication: For  delivery delivered    Colace 100 mg oral capsule  -- 1 cap(s) by mouth 2 times a day   -- Medication should be taken with plenty of water.    -- Indication: For  delivery delivered    simethicone 80 mg oral tablet, chewable  -- 1 tab(s) by mouth every 4 hours, As needed, Gas  -- Indication: For  delivery delivered    Vitamin D3 2000 intl units oral tablet  -- 1 tab(s) by mouth once a day  -- Indication: For  delivery delivered

## 2020-10-16 NOTE — OB PROVIDER TRIAGE NOTE - GRAVIDA, OB PROFILE
----- Message from Leonidas Dave sent at 10/16/2020  3:07 PM CDT -----  Regarding: pt  Type: Needs Medical Advice    Who Called:  pt    Best Call Back Number: [phone]   Additional Information: [employer] will be sending over [employer] paperwork for PT. Please approve so pt can continue [employer] [facility]. PT states should be receiving w/in next hour or so.      
Returned patient's call, got voicemail, left return call message.  
2

## 2021-08-18 NOTE — OB RN TRIAGE NOTE - NS_TRIAGEADDITIONAL COMMENTS_OBGYN_ALL_OB_FT
Patient has been in [facility] for one year.   If you wish to have your patient continue in the clinic, please sign attached order.  Thank you.  
ht 5-3  196  last po 730am fs @7am 77

## 2022-05-04 NOTE — OB RN INTRAOPERATIVE NOTE - NS_SKINPREP_OBGYN_ALL_OB
May 4, 2022    Patient: Jennifer Lazaro  YOB: 1962  Date of Last Encounter: 4/6/2022      To whom it may concern:     Jennifer Lazaro has tested positive for COVID-19 (Coronavirus)  She may return to work on 5/9/22, which is 10 days from illness onset as symptoms have persisted and 24 hours without fever      Sincerely,         Neto [facility], [address] [facility] [place]

## 2023-10-06 NOTE — OB PROVIDER DELIVERY SUMMARY - BABY A: DATE/TIME OF DELIVERY
05-Dec-2019 09:10 Imiquimod Counseling:  I discussed with the patient the risks of imiquimod including but not limited to erythema, scaling, itching, weeping, crusting, and pain.  Patient understands that the inflammatory response to imiquimod is variable from person to person and was educated regarded proper titration schedule.  If flu-like symptoms develop, patient knows to discontinue the medication and contact us.

## 2024-04-08 NOTE — OB PST NOTE - PAIN CHRONIC, PROFILE
Spoke with pt regarding her Wegovy. Said she has not been able to find it at any pharmacies. Is wondering if there is something else she can try? [facility] pharmacy listed.    no

## 2024-10-09 ENCOUNTER — TRANSCRIPTION ENCOUNTER (OUTPATIENT)
Age: 44
End: 2024-10-09

## 2025-01-08 ENCOUNTER — NON-APPOINTMENT (OUTPATIENT)
Age: 45
End: 2025-01-08

## 2025-01-23 ENCOUNTER — APPOINTMENT (OUTPATIENT)
Dept: INTERNAL MEDICINE | Facility: CLINIC | Age: 45
End: 2025-01-23
Payer: COMMERCIAL

## 2025-01-23 VITALS
WEIGHT: 222 LBS | SYSTOLIC BLOOD PRESSURE: 148 MMHG | OXYGEN SATURATION: 96 % | DIASTOLIC BLOOD PRESSURE: 95 MMHG | HEART RATE: 86 BPM | BODY MASS INDEX: 39.34 KG/M2 | HEIGHT: 63 IN

## 2025-01-23 VITALS — DIASTOLIC BLOOD PRESSURE: 100 MMHG | SYSTOLIC BLOOD PRESSURE: 130 MMHG

## 2025-01-23 DIAGNOSIS — Z00.00 ENCOUNTER FOR GENERAL ADULT MEDICAL EXAMINATION W/OUT ABNORMAL FINDINGS: ICD-10-CM

## 2025-01-23 DIAGNOSIS — E78.5 HYPERLIPIDEMIA, UNSPECIFIED: ICD-10-CM

## 2025-01-23 DIAGNOSIS — E28.2 POLYCYSTIC OVARIAN SYNDROME: ICD-10-CM

## 2025-01-23 DIAGNOSIS — Z82.49 FAMILY HISTORY OF ISCHEMIC HEART DISEASE AND OTHER DISEASES OF THE CIRCULATORY SYSTEM: ICD-10-CM

## 2025-01-23 DIAGNOSIS — E55.9 VITAMIN D DEFICIENCY, UNSPECIFIED: ICD-10-CM

## 2025-01-23 DIAGNOSIS — O99.810 ABNORMAL GLUCOSE COMPLICATING PREGNANCY: ICD-10-CM

## 2025-01-23 DIAGNOSIS — G47.30 SLEEP APNEA, UNSPECIFIED: ICD-10-CM

## 2025-01-23 PROCEDURE — G2211 COMPLEX E/M VISIT ADD ON: CPT | Mod: NC

## 2025-01-23 PROCEDURE — 99386 PREV VISIT NEW AGE 40-64: CPT

## 2025-01-23 PROCEDURE — 99214 OFFICE O/P EST MOD 30 MIN: CPT | Mod: 25

## 2025-01-26 LAB
25(OH)D3 SERPL-MCNC: 11.9 NG/ML
ALBUMIN SERPL ELPH-MCNC: 4.5 G/DL
ALP BLD-CCNC: 79 U/L
ALT SERPL-CCNC: 124 U/L
ANION GAP SERPL CALC-SCNC: 13 MMOL/L
AST SERPL-CCNC: 92 U/L
BASOPHILS # BLD AUTO: 0.02 K/UL
BASOPHILS NFR BLD AUTO: 0.2 %
BILIRUB SERPL-MCNC: 0.4 MG/DL
BUN SERPL-MCNC: 10 MG/DL
CALCIUM SERPL-MCNC: 9.1 MG/DL
CHLORIDE SERPL-SCNC: 102 MMOL/L
CHOLEST SERPL-MCNC: 201 MG/DL
CO2 SERPL-SCNC: 24 MMOL/L
CREAT SERPL-MCNC: 0.55 MG/DL
EGFR: 117 ML/MIN/1.73M2
EOSINOPHIL # BLD AUTO: 0.08 K/UL
EOSINOPHIL NFR BLD AUTO: 1 %
ESTIMATED AVERAGE GLUCOSE: 148 MG/DL
GLUCOSE SERPL-MCNC: 102 MG/DL
HBA1C MFR BLD HPLC: 6.8 %
HCT VFR BLD CALC: 41.8 %
HDLC SERPL-MCNC: 61 MG/DL
HGB BLD-MCNC: 13.3 G/DL
IMM GRANULOCYTES NFR BLD AUTO: 0.2 %
LDLC SERPL CALC-MCNC: 124 MG/DL
LYMPHOCYTES # BLD AUTO: 1.6 K/UL
LYMPHOCYTES NFR BLD AUTO: 19.3 %
MAN DIFF?: NORMAL
MCHC RBC-ENTMCNC: 27.8 PG
MCHC RBC-ENTMCNC: 31.8 G/DL
MCV RBC AUTO: 87.4 FL
MONOCYTES # BLD AUTO: 0.49 K/UL
MONOCYTES NFR BLD AUTO: 5.9 %
NEUTROPHILS # BLD AUTO: 6.09 K/UL
NEUTROPHILS NFR BLD AUTO: 73.4 %
NONHDLC SERPL-MCNC: 140 MG/DL
PLATELET # BLD AUTO: 272 K/UL
POTASSIUM SERPL-SCNC: 4.5 MMOL/L
PROT SERPL-MCNC: 7.2 G/DL
RBC # BLD: 4.78 M/UL
RBC # FLD: 13.6 %
SODIUM SERPL-SCNC: 139 MMOL/L
TRIGL SERPL-MCNC: 91 MG/DL
TSH SERPL-ACNC: 0.92 UIU/ML
VIT B12 SERPL-MCNC: 902 PG/ML
WBC # FLD AUTO: 8.3 K/UL

## 2025-01-29 DIAGNOSIS — E11.9 TYPE 2 DIABETES MELLITUS W/OUT COMPLICATIONS: ICD-10-CM

## 2025-01-29 RX ORDER — METFORMIN ER 500 MG 500 MG/1
500 TABLET ORAL DAILY
Qty: 90 | Refills: 3 | Status: ACTIVE | COMMUNITY
Start: 2025-01-29 | End: 1900-01-01

## 2025-02-06 ENCOUNTER — OUTPATIENT (OUTPATIENT)
Dept: OUTPATIENT SERVICES | Facility: HOSPITAL | Age: 45
LOS: 1 days | End: 2025-02-06
Payer: COMMERCIAL

## 2025-02-06 DIAGNOSIS — G47.33 OBSTRUCTIVE SLEEP APNEA (ADULT) (PEDIATRIC): ICD-10-CM

## 2025-02-06 PROCEDURE — 95800 SLP STDY UNATTENDED: CPT | Mod: 26

## 2025-02-06 PROCEDURE — 95800 SLP STDY UNATTENDED: CPT

## 2025-04-23 ENCOUNTER — NON-APPOINTMENT (OUTPATIENT)
Age: 45
End: 2025-04-23

## 2025-04-24 ENCOUNTER — APPOINTMENT (OUTPATIENT)
Dept: INTERNAL MEDICINE | Facility: CLINIC | Age: 45
End: 2025-04-24
Payer: COMMERCIAL

## 2025-04-24 VITALS
HEIGHT: 63 IN | OXYGEN SATURATION: 97 % | WEIGHT: 218 LBS | SYSTOLIC BLOOD PRESSURE: 138 MMHG | HEART RATE: 86 BPM | DIASTOLIC BLOOD PRESSURE: 78 MMHG | BODY MASS INDEX: 38.62 KG/M2

## 2025-04-24 VITALS — SYSTOLIC BLOOD PRESSURE: 130 MMHG | DIASTOLIC BLOOD PRESSURE: 80 MMHG

## 2025-04-24 DIAGNOSIS — R74.8 ABNORMAL LEVELS OF OTHER SERUM ENZYMES: ICD-10-CM

## 2025-04-24 DIAGNOSIS — E78.5 HYPERLIPIDEMIA, UNSPECIFIED: ICD-10-CM

## 2025-04-24 DIAGNOSIS — E11.9 TYPE 2 DIABETES MELLITUS W/OUT COMPLICATIONS: ICD-10-CM

## 2025-04-24 PROCEDURE — 99214 OFFICE O/P EST MOD 30 MIN: CPT

## 2025-04-24 PROCEDURE — G2211 COMPLEX E/M VISIT ADD ON: CPT | Mod: NC

## 2025-04-25 LAB
ALBUMIN SERPL ELPH-MCNC: 4.6 G/DL
ALP BLD-CCNC: 77 U/L
ALT SERPL-CCNC: 72 U/L
ANION GAP SERPL CALC-SCNC: 15 MMOL/L
AST SERPL-CCNC: 55 U/L
BILIRUB SERPL-MCNC: 0.3 MG/DL
BUN SERPL-MCNC: 13 MG/DL
CALCIUM SERPL-MCNC: 9.6 MG/DL
CHLORIDE SERPL-SCNC: 104 MMOL/L
CHOLEST SERPL-MCNC: 206 MG/DL
CO2 SERPL-SCNC: 23 MMOL/L
CREAT SERPL-MCNC: 0.62 MG/DL
EGFRCR SERPLBLD CKD-EPI 2021: 113 ML/MIN/1.73M2
ESTIMATED AVERAGE GLUCOSE: 143 MG/DL
GLUCOSE SERPL-MCNC: 106 MG/DL
HBA1C MFR BLD HPLC: 6.6 %
HDLC SERPL-MCNC: 63 MG/DL
LDLC SERPL-MCNC: 125 MG/DL
NONHDLC SERPL-MCNC: 143 MG/DL
POTASSIUM SERPL-SCNC: 4.5 MMOL/L
PROT SERPL-MCNC: 7.4 G/DL
SODIUM SERPL-SCNC: 142 MMOL/L
TRIGL SERPL-MCNC: 102 MG/DL

## 2025-05-22 ENCOUNTER — APPOINTMENT (OUTPATIENT)
Dept: PULMONOLOGY | Facility: CLINIC | Age: 45
End: 2025-05-22
Payer: COMMERCIAL

## 2025-05-22 VITALS
DIASTOLIC BLOOD PRESSURE: 86 MMHG | HEIGHT: 63 IN | SYSTOLIC BLOOD PRESSURE: 142 MMHG | BODY MASS INDEX: 38.62 KG/M2 | WEIGHT: 218 LBS | TEMPERATURE: 97.9 F | HEART RATE: 76 BPM | OXYGEN SATURATION: 98 %

## 2025-05-22 DIAGNOSIS — E88.810 METABOLIC SYNDROME: ICD-10-CM

## 2025-05-22 DIAGNOSIS — G47.33 OBSTRUCTIVE SLEEP APNEA (ADULT) (PEDIATRIC): ICD-10-CM

## 2025-05-22 PROCEDURE — 94060 EVALUATION OF WHEEZING: CPT

## 2025-05-22 PROCEDURE — 94727 GAS DIL/WSHOT DETER LNG VOL: CPT

## 2025-05-22 PROCEDURE — 99214 OFFICE O/P EST MOD 30 MIN: CPT | Mod: 25

## 2025-05-22 PROCEDURE — 94729 DIFFUSING CAPACITY: CPT

## 2025-06-16 NOTE — OB RN PATIENT PROFILE - AGENT'S NAME
Attempted to call Pt's daughters number listed on file, caller picks up and then hangs up. If Pt or Pt's daughter calls back please inform that they need to send a  remote transmission by pushing the heart button of the monitor. If they have issues please have them contact sam Pt support [phone].   Sim Nahum

## 2025-07-24 ENCOUNTER — APPOINTMENT (OUTPATIENT)
Dept: INTERNAL MEDICINE | Facility: CLINIC | Age: 45
End: 2025-07-24
Payer: COMMERCIAL

## 2025-07-24 ENCOUNTER — APPOINTMENT (OUTPATIENT)
Dept: PULMONOLOGY | Facility: CLINIC | Age: 45
End: 2025-07-24
Payer: COMMERCIAL

## 2025-07-24 VITALS — DIASTOLIC BLOOD PRESSURE: 84 MMHG | SYSTOLIC BLOOD PRESSURE: 126 MMHG

## 2025-07-24 VITALS
BODY MASS INDEX: 38.45 KG/M2 | OXYGEN SATURATION: 99 % | DIASTOLIC BLOOD PRESSURE: 86 MMHG | WEIGHT: 217 LBS | HEIGHT: 63 IN | HEART RATE: 79 BPM | SYSTOLIC BLOOD PRESSURE: 135 MMHG

## 2025-07-24 VITALS
OXYGEN SATURATION: 96 % | DIASTOLIC BLOOD PRESSURE: 82 MMHG | BODY MASS INDEX: 38.26 KG/M2 | SYSTOLIC BLOOD PRESSURE: 138 MMHG | TEMPERATURE: 97.3 F | HEART RATE: 96 BPM | WEIGHT: 216 LBS

## 2025-07-24 DIAGNOSIS — G47.33 OBSTRUCTIVE SLEEP APNEA (ADULT) (PEDIATRIC): ICD-10-CM

## 2025-07-24 DIAGNOSIS — E78.5 HYPERLIPIDEMIA, UNSPECIFIED: ICD-10-CM

## 2025-07-24 DIAGNOSIS — E11.9 TYPE 2 DIABETES MELLITUS W/OUT COMPLICATIONS: ICD-10-CM

## 2025-07-24 PROCEDURE — G2211 COMPLEX E/M VISIT ADD ON: CPT | Mod: NC

## 2025-07-24 PROCEDURE — 99213 OFFICE O/P EST LOW 20 MIN: CPT

## 2025-07-24 PROCEDURE — 99214 OFFICE O/P EST MOD 30 MIN: CPT

## 2025-07-28 ENCOUNTER — APPOINTMENT (OUTPATIENT)
Dept: PULMONOLOGY | Facility: CLINIC | Age: 45
End: 2025-07-28

## 2025-07-29 ENCOUNTER — TRANSCRIPTION ENCOUNTER (OUTPATIENT)
Age: 45
End: 2025-07-29

## 2025-07-29 DIAGNOSIS — G47.30 SLEEP APNEA, UNSPECIFIED: ICD-10-CM

## 2025-07-29 LAB
ALBUMIN SERPL ELPH-MCNC: 4.5 G/DL
ALP BLD-CCNC: 76 U/L
ALT SERPL-CCNC: 65 U/L
ANION GAP SERPL CALC-SCNC: 15 MMOL/L
AST SERPL-CCNC: 35 U/L
BILIRUB SERPL-MCNC: 0.4 MG/DL
BUN SERPL-MCNC: 13 MG/DL
CALCIUM SERPL-MCNC: 9.5 MG/DL
CHLORIDE SERPL-SCNC: 105 MMOL/L
CHOLEST SERPL-MCNC: 199 MG/DL
CO2 SERPL-SCNC: 23 MMOL/L
CREAT SERPL-MCNC: 0.6 MG/DL
EGFRCR SERPLBLD CKD-EPI 2021: 114 ML/MIN/1.73M2
ESTIMATED AVERAGE GLUCOSE: 134 MG/DL
GLUCOSE SERPL-MCNC: 105 MG/DL
HBA1C MFR BLD HPLC: 6.3 %
HDLC SERPL-MCNC: 61 MG/DL
LDLC SERPL-MCNC: 122 MG/DL
NONHDLC SERPL-MCNC: 138 MG/DL
POTASSIUM SERPL-SCNC: 4.4 MMOL/L
PROT SERPL-MCNC: 7.5 G/DL
SODIUM SERPL-SCNC: 143 MMOL/L
TRIGL SERPL-MCNC: 92 MG/DL

## 2025-07-30 RX ORDER — TIRZEPATIDE 2.5 MG/.5ML
2.5 INJECTION, SOLUTION SUBCUTANEOUS
Qty: 2 | Refills: 1 | Status: ACTIVE | COMMUNITY
Start: 2025-07-29

## 2025-08-04 ENCOUNTER — TRANSCRIPTION ENCOUNTER (OUTPATIENT)
Age: 45
End: 2025-08-04

## 2025-08-04 ENCOUNTER — APPOINTMENT (OUTPATIENT)
Dept: PULMONOLOGY | Facility: CLINIC | Age: 45
End: 2025-08-04

## 2025-08-28 ENCOUNTER — TRANSCRIPTION ENCOUNTER (OUTPATIENT)
Age: 45
End: 2025-08-28

## 2025-08-29 ENCOUNTER — TRANSCRIPTION ENCOUNTER (OUTPATIENT)
Age: 45
End: 2025-08-29